# Patient Record
Sex: FEMALE | Race: WHITE | NOT HISPANIC OR LATINO | Employment: PART TIME | URBAN - METROPOLITAN AREA
[De-identification: names, ages, dates, MRNs, and addresses within clinical notes are randomized per-mention and may not be internally consistent; named-entity substitution may affect disease eponyms.]

---
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
## 2019-06-16 ENCOUNTER — APPOINTMENT (OUTPATIENT)
Dept: RADIOLOGY | Facility: MEDICAL CENTER | Age: 19
End: 2019-06-16
Attending: PHYSICIAN ASSISTANT
Payer: COMMERCIAL

## 2019-06-16 ENCOUNTER — OFFICE VISIT (OUTPATIENT)
Dept: URGENT CARE | Facility: MEDICAL CENTER | Age: 19
End: 2019-06-16
Payer: COMMERCIAL

## 2019-06-16 VITALS
OXYGEN SATURATION: 99 % | DIASTOLIC BLOOD PRESSURE: 60 MMHG | HEIGHT: 63 IN | RESPIRATION RATE: 20 BRPM | HEART RATE: 78 BPM | TEMPERATURE: 97.7 F | SYSTOLIC BLOOD PRESSURE: 114 MMHG

## 2019-06-16 DIAGNOSIS — S80.211A ABRASION, KNEE, RIGHT, INITIAL ENCOUNTER: ICD-10-CM

## 2019-06-16 DIAGNOSIS — S90.31XA CONTUSION OF RIGHT FOOT, INITIAL ENCOUNTER: ICD-10-CM

## 2019-06-16 DIAGNOSIS — S99.911A INJURY OF RIGHT ANKLE, INITIAL ENCOUNTER: ICD-10-CM

## 2019-06-16 DIAGNOSIS — S99.911A INJURY OF RIGHT ANKLE, INITIAL ENCOUNTER: Primary | ICD-10-CM

## 2019-06-16 PROCEDURE — G0382 LEV 3 HOSP TYPE B ED VISIT: HCPCS | Performed by: PHYSICIAN ASSISTANT

## 2019-06-16 PROCEDURE — 73610 X-RAY EXAM OF ANKLE: CPT

## 2019-06-16 PROCEDURE — 99283 EMERGENCY DEPT VISIT LOW MDM: CPT | Performed by: PHYSICIAN ASSISTANT

## 2019-06-16 PROCEDURE — 99203 OFFICE O/P NEW LOW 30 MIN: CPT | Performed by: PHYSICIAN ASSISTANT

## 2019-06-16 PROCEDURE — 73630 X-RAY EXAM OF FOOT: CPT

## 2019-06-16 RX ORDER — NAPROXEN 500 MG/1
500 TABLET ORAL 2 TIMES DAILY WITH MEALS
Qty: 30 TABLET | Refills: 0 | Status: SHIPPED | OUTPATIENT
Start: 2019-06-16

## 2020-05-08 ENCOUNTER — HOSPITAL ENCOUNTER (EMERGENCY)
Facility: HOSPITAL | Age: 20
Discharge: HOME | End: 2020-05-08
Attending: EMERGENCY MEDICINE | Admitting: EMERGENCY MEDICINE
Payer: COMMERCIAL

## 2020-05-08 VITALS
WEIGHT: 146 LBS | OXYGEN SATURATION: 99 % | TEMPERATURE: 97.5 F | RESPIRATION RATE: 17 BRPM | HEART RATE: 95 BPM | BODY MASS INDEX: 25.86 KG/M2 | SYSTOLIC BLOOD PRESSURE: 148 MMHG | DIASTOLIC BLOOD PRESSURE: 82 MMHG

## 2020-05-08 DIAGNOSIS — N89.8 VAGINAL DISCHARGE: ICD-10-CM

## 2020-05-08 DIAGNOSIS — Z20.2 POSSIBLE EXPOSURE TO STD: Primary | ICD-10-CM

## 2020-05-08 LAB
BILIRUB UR QL STRIP: NEGATIVE
CLARITY UR: CLEAR
COLOR UR: NORMAL
EXT PREG TEST URINE: NEGATIVE
EXT. CONTROL ED NAV: NORMAL
GLUCOSE UR STRIP-MCNC: NEGATIVE MG/DL
HGB UR QL STRIP.AUTO: NEGATIVE
KETONES UR STRIP-MCNC: NEGATIVE MG/DL
LEUKOCYTE ESTERASE UR QL STRIP: NEGATIVE
NITRITE UR QL STRIP: NEGATIVE
PH UR STRIP.AUTO: 7 [PH]
PROT UR STRIP-MCNC: NEGATIVE MG/DL
SP GR UR STRIP.AUTO: 1.01 (ref 1–1.04)
UROBILINOGEN UA: NEGATIVE MG/DL

## 2020-05-08 PROCEDURE — 99284 EMERGENCY DEPT VISIT MOD MDM: CPT | Performed by: PHYSICIAN ASSISTANT

## 2020-05-08 PROCEDURE — 96372 THER/PROPH/DIAG INJ SC/IM: CPT

## 2020-05-08 PROCEDURE — 99283 EMERGENCY DEPT VISIT LOW MDM: CPT

## 2020-05-08 PROCEDURE — 81003 URINALYSIS AUTO W/O SCOPE: CPT | Performed by: PHYSICIAN ASSISTANT

## 2020-05-08 PROCEDURE — 87186 SC STD MICRODIL/AGAR DIL: CPT | Performed by: PHYSICIAN ASSISTANT

## 2020-05-08 PROCEDURE — 87086 URINE CULTURE/COLONY COUNT: CPT | Performed by: PHYSICIAN ASSISTANT

## 2020-05-08 PROCEDURE — 87077 CULTURE AEROBIC IDENTIFY: CPT | Performed by: PHYSICIAN ASSISTANT

## 2020-05-08 PROCEDURE — 87491 CHLMYD TRACH DNA AMP PROBE: CPT | Performed by: PHYSICIAN ASSISTANT

## 2020-05-08 PROCEDURE — 81025 URINE PREGNANCY TEST: CPT | Performed by: PHYSICIAN ASSISTANT

## 2020-05-08 PROCEDURE — 87591 N.GONORRHOEAE DNA AMP PROB: CPT | Performed by: PHYSICIAN ASSISTANT

## 2020-05-08 RX ORDER — AZITHROMYCIN 250 MG/1
1000 TABLET, FILM COATED ORAL ONCE
Status: COMPLETED | OUTPATIENT
Start: 2020-05-08 | End: 2020-05-08

## 2020-05-08 RX ORDER — METRONIDAZOLE 500 MG/1
500 TABLET ORAL EVERY 12 HOURS SCHEDULED
Qty: 14 TABLET | Refills: 0 | Status: SHIPPED | OUTPATIENT
Start: 2020-05-08 | End: 2020-05-15

## 2020-05-08 RX ADMIN — LIDOCAINE HYDROCHLORIDE 250 MG: 10 INJECTION, SOLUTION EPIDURAL; INFILTRATION; INTRACAUDAL; PERINEURAL at 16:30

## 2020-05-08 RX ADMIN — AZITHROMYCIN 1000 MG: 250 TABLET, FILM COATED ORAL at 16:28

## 2020-05-10 LAB — BACTERIA UR CULT: ABNORMAL

## 2020-05-11 LAB
C TRACH DNA SPEC QL NAA+PROBE: NEGATIVE
N GONORRHOEA DNA SPEC QL NAA+PROBE: NEGATIVE

## 2022-08-08 ENCOUNTER — HOSPITAL ENCOUNTER (EMERGENCY)
Facility: HOSPITAL | Age: 22
Discharge: HOME | End: 2022-08-08
Attending: EMERGENCY MEDICINE
Payer: COMMERCIAL

## 2022-08-08 VITALS
BODY MASS INDEX: 28.7 KG/M2 | SYSTOLIC BLOOD PRESSURE: 134 MMHG | WEIGHT: 162.04 LBS | OXYGEN SATURATION: 98 % | RESPIRATION RATE: 16 BRPM | TEMPERATURE: 98 F | DIASTOLIC BLOOD PRESSURE: 70 MMHG | HEART RATE: 74 BPM

## 2022-08-08 DIAGNOSIS — M54.50 ACUTE BILATERAL LOW BACK PAIN WITHOUT SCIATICA: Primary | ICD-10-CM

## 2022-08-08 DIAGNOSIS — M54.9 BACK PAIN: ICD-10-CM

## 2022-08-08 PROCEDURE — 99283 EMERGENCY DEPT VISIT LOW MDM: CPT

## 2022-08-08 PROCEDURE — 99284 EMERGENCY DEPT VISIT MOD MDM: CPT

## 2022-08-08 RX ORDER — LIDOCAINE 50 MG/G
1 PATCH TOPICAL ONCE
Status: DISCONTINUED | OUTPATIENT
Start: 2022-08-08 | End: 2022-08-08 | Stop reason: HOSPADM

## 2022-08-08 RX ORDER — DIAZEPAM 2 MG/1
2 TABLET ORAL 2 TIMES DAILY
Qty: 14 TABLET | Refills: 0 | Status: SHIPPED | OUTPATIENT
Start: 2022-08-08 | End: 2022-08-08 | Stop reason: CLARIF

## 2022-08-08 RX ORDER — DIAZEPAM 2 MG/1
2 TABLET ORAL 2 TIMES DAILY
Qty: 14 TABLET | Refills: 0 | Status: SHIPPED | OUTPATIENT
Start: 2022-08-08 | End: 2022-08-15

## 2022-08-08 RX ADMIN — LIDOCAINE 1 PATCH: 50 PATCH CUTANEOUS at 09:04

## 2022-08-08 NOTE — ED PROVIDER NOTES
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      24Year old female presents to the ED with chief complaint of lower back pain for the past 5 days  Patient states that she just started a new job at [employer] a few weeks ago and when she was taking out the trash a few days ago she bent down in a weird position and the pain started a day after  Patient has tried taking ibuprofen and tylenol for her pain but it has not helped it to go away  Patient describes pain as a dull pain on bilateral lower back and moves down into her buttocks  Patient denies pain on her spine and states that it is only on the sides and down into her buttocks  Patient denies any fever, saddle paresthesias, numbness or tingling down the legs, gait abnormalities, spine pain, fecal or urinary incontinence, burning on urination, or any other symptoms at this time  Patient denies any medication use other than above mentioned  History provided by:  Patient   used: No    Back Pain  Associated symptoms: no abdominal pain, no chest pain, no dysuria, no fever, no headaches, no numbness, no pelvic pain and no weakness        Prior to Admission Medications   Prescriptions Last Dose Informant Patient Reported? Taking?   naproxen (NAPROSYN) 500 mg tablet   No No   Sig: Take 1 tablet (500 mg total) by mouth 2 (two) times a day with meals      Facility-Administered Medications: None       Past Medical History:   Diagnosis Date    Asthma        Past Surgical History:   Procedure Laterality Date    NO PAST SURGERIES         History reviewed  No pertinent family history  I have reviewed and agree with the history as documented      E-Cigarette/Vaping    E-Cigarette Use Never User      E-Cigarette/Vaping Substances     Social History     Tobacco Use    Smoking status: Never Smoker    Smokeless tobacco: Never Used   Vaping Use    Vaping Use: Never used   Substance Use Topics    Alcohol use: Yes    Drug use: No       Review of Systems   Constitutional: Negative for chills and fever  HENT: Negative for ear pain and sore throat  Eyes: Negative for pain and visual disturbance  Respiratory: Negative for cough and shortness of breath  Cardiovascular: Negative for chest pain and palpitations  Gastrointestinal: Negative for abdominal pain and vomiting  Genitourinary: Negative for difficulty urinating, dysuria, flank pain, frequency, hematuria, menstrual problem, pelvic pain, urgency, vaginal bleeding, vaginal discharge and vaginal pain  Musculoskeletal: Positive for back pain  Negative for arthralgias, gait problem, joint swelling, neck pain and neck stiffness  Skin: Negative for color change and rash  Neurological: Negative for dizziness, seizures, syncope, weakness, numbness and headaches  All other systems reviewed and are negative  Physical Exam  Physical Exam  Vitals and nursing note reviewed  Constitutional:       General: She is not in acute distress  Appearance: She is well-developed  HENT:      Head: Normocephalic and atraumatic  Eyes:      Conjunctiva/sclera: Conjunctivae normal    Cardiovascular:      Rate and Rhythm: Normal rate and regular rhythm  Pulses: Normal pulses  Heart sounds: Normal heart sounds  No murmur heard  Pulmonary:      Effort: Pulmonary effort is normal  No respiratory distress  Breath sounds: Normal breath sounds  Abdominal:      Palpations: Abdomen is soft  Tenderness: There is no abdominal tenderness  Musculoskeletal:         General: Tenderness (Bilateral lower back) present  No deformity or signs of injury  Normal range of motion  Cervical back: Neck supple  Right lower leg: No edema  Left lower leg: No edema  Skin:     General: Skin is warm and dry  Coloration: Skin is not jaundiced or pale  Findings: No erythema or rash  Neurological:      Mental Status: She is alert  Vital Signs  ED Triage Vitals [08/08/22 0828]   Temperature Pulse Respirations Blood Pressure SpO2   98 °F (36 7 °C) 74 16 134/70 98 %      Temp src Heart Rate Source Patient Position - Orthostatic VS BP Location FiO2 (%)   -- Monitor Sitting Right arm --      Pain Score       --           Vitals:    08/08/22 0828   BP: 134/70   Pulse: 74   Patient Position - Orthostatic VS: Sitting         Visual Acuity      ED Medications  Medications - No data to display    Diagnostic Studies  Results Reviewed     None                 No orders to display              Procedures  Procedures         ED Course                                             MDM  Number of Diagnoses or Management Options  Back pain: minor  Diagnosis management comments: Patient presents with signs and symptoms of lower back pain due to musculoskeletal strain  Patient denies any red flag or constitutional symptoms  Treat with topical lidocaine patch applied to the lower back and sent home with Valium BID for 7 days  Amount and/or Complexity of Data Reviewed  Clinical lab tests: reviewed  Tests in the medicine section of CPT®: reviewed    Risk of Complications, Morbidity, and/or Mortality  Presenting problems: minimal  Diagnostic procedures: minimal  Management options: minimal        Disposition  Final diagnoses:   None     ED Disposition     None      Follow-up Information    None         Patient's Medications   Discharge Prescriptions    No medications on file       No discharge procedures on file      PDMP Review     None          ED Provider  Electronically Signed by           Goyo Medrano PA-C  08/08/22 9791

## 2022-08-08 NOTE — DISCHARGE INSTRUCTIONS
Patient informed that her pain was likely due to a musculoskeletal issue with her history of starting a new job and bending over when she was taking out the trash  Patient informed that she will be given a lidocaine patch over the affected areas in the ED and will be prescribed Valium twice a day for 7 days for lower back pain  Patient informed that she may continue to take ibuprofen or tylenol as needed for pain control, but to ensure that she takes ibuprofen or other NSAIDs with food to prevent stomach ulcers or GI upset  Patient informed to not operate motor vehicle or heavy machinery while she is taking Valium as it can make her drowsy  Patient informed to stop taking the Valium and return to the ER if she experiences any of the following symptoms as they may be due to an allergic reaction to the prescription medication: Body swelling, generalized rash, difficulty breathing  Patient also informed to return to the ER if she were to experience any worsening of the pain, loss of bladder or bowel control, or any numbness or tingling in the groin area or down the legs

## 2022-08-08 NOTE — Clinical Note
Joon Pardo was seen and treated in our emergency department on 8/8/2022  No restrictions            Diagnosis:     Triston  may return to school on return date  She may return on this date: 08/09/2022    Triston visited the [place] [address] and [facility] and is cleared back for work tomorrow, 8/9/2022     If you have any questions or concerns, please don't hesitate to call        Jossy Elena PA-C    ______________________________           _______________          _______________  Hospital Representative                              Date                                Time

## 2024-04-28 ENCOUNTER — HOSPITAL ENCOUNTER (EMERGENCY)
Facility: HOSPITAL | Age: 24
Discharge: HOME | End: 2024-04-28
Attending: EMERGENCY MEDICINE

## 2024-04-28 ENCOUNTER — APPOINTMENT (EMERGENCY)
Dept: ULTRASOUND IMAGING | Facility: HOSPITAL | Age: 24
End: 2024-04-28

## 2024-04-28 VITALS
TEMPERATURE: 98.1 F | OXYGEN SATURATION: 97 % | SYSTOLIC BLOOD PRESSURE: 137 MMHG | HEART RATE: 63 BPM | BODY MASS INDEX: 30.27 KG/M2 | DIASTOLIC BLOOD PRESSURE: 72 MMHG | WEIGHT: 170.86 LBS | RESPIRATION RATE: 18 BRPM

## 2024-04-28 DIAGNOSIS — Z34.90 PREGNANCY: ICD-10-CM

## 2024-04-28 DIAGNOSIS — M54.50 LOW BACK PAIN: Primary | ICD-10-CM

## 2024-04-28 LAB
ABO GROUP BLD: NORMAL
ANION GAP SERPL CALCULATED.3IONS-SCNC: 6 MMOL/L (ref 4–13)
B-HCG SERPL-ACNC: 438 MIU/ML (ref 0–5)
BACTERIA UR QL AUTO: ABNORMAL /HPF
BASOPHILS # BLD AUTO: 0.02 THOUSANDS/ÂΜL (ref 0–0.1)
BASOPHILS NFR BLD AUTO: 0 % (ref 0–1)
BILIRUB UR QL STRIP: ABNORMAL
BUN SERPL-MCNC: 8 MG/DL (ref 5–25)
CALCIUM SERPL-MCNC: 9.5 MG/DL (ref 8.4–10.2)
CHLORIDE SERPL-SCNC: 104 MMOL/L (ref 96–108)
CLARITY UR: ABNORMAL
CO2 SERPL-SCNC: 26 MMOL/L (ref 21–32)
COLOR UR: ABNORMAL
CREAT SERPL-MCNC: 0.85 MG/DL (ref 0.6–1.3)
EOSINOPHIL # BLD AUTO: 0.04 THOUSAND/ÂΜL (ref 0–0.61)
EOSINOPHIL NFR BLD AUTO: 1 % (ref 0–6)
ERYTHROCYTE [DISTWIDTH] IN BLOOD BY AUTOMATED COUNT: 12.2 % (ref 11.6–15.1)
EXT PREGNANCY TEST URINE: POSITIVE
EXT. CONTROL: ABNORMAL
GFR SERPL CREATININE-BSD FRML MDRD: 96 ML/MIN/1.73SQ M
GLUCOSE SERPL-MCNC: 89 MG/DL (ref 65–140)
GLUCOSE UR STRIP-MCNC: NEGATIVE MG/DL
HCT VFR BLD AUTO: 38.4 % (ref 34.8–46.1)
HGB BLD-MCNC: 12.9 G/DL (ref 11.5–15.4)
HGB UR QL STRIP.AUTO: ABNORMAL
IMM GRANULOCYTES # BLD AUTO: 0.02 THOUSAND/UL (ref 0–0.2)
IMM GRANULOCYTES NFR BLD AUTO: 0 % (ref 0–2)
KETONES UR STRIP-MCNC: ABNORMAL MG/DL
LEUKOCYTE ESTERASE UR QL STRIP: NEGATIVE
LYMPHOCYTES # BLD AUTO: 1.53 THOUSANDS/ÂΜL (ref 0.6–4.47)
LYMPHOCYTES NFR BLD AUTO: 20 % (ref 14–44)
MCH RBC QN AUTO: 30.4 PG (ref 26.8–34.3)
MCHC RBC AUTO-ENTMCNC: 33.6 G/DL (ref 31.4–37.4)
MCV RBC AUTO: 91 FL (ref 82–98)
MONOCYTES # BLD AUTO: 0.6 THOUSAND/ÂΜL (ref 0.17–1.22)
MONOCYTES NFR BLD AUTO: 8 % (ref 4–12)
MUCOUS THREADS UR QL AUTO: ABNORMAL
NEUTROPHILS # BLD AUTO: 5.59 THOUSANDS/ÂΜL (ref 1.85–7.62)
NEUTS SEG NFR BLD AUTO: 71 % (ref 43–75)
NITRITE UR QL STRIP: NEGATIVE
NON-SQ EPI CELLS URNS QL MICRO: ABNORMAL /HPF
NRBC BLD AUTO-RTO: 0 /100 WBCS
PH UR STRIP.AUTO: 6 [PH] (ref 4.5–8)
PLATELET # BLD AUTO: 247 THOUSANDS/UL (ref 149–390)
PMV BLD AUTO: 10.1 FL (ref 8.9–12.7)
POTASSIUM SERPL-SCNC: 4 MMOL/L (ref 3.5–5.3)
PROT UR STRIP-MCNC: ABNORMAL MG/DL
RBC # BLD AUTO: 4.24 MILLION/UL (ref 3.81–5.12)
RBC #/AREA URNS AUTO: ABNORMAL /HPF
RH BLD: POSITIVE
SODIUM SERPL-SCNC: 136 MMOL/L (ref 135–147)
SP GR UR STRIP.AUTO: >=1.03 (ref 1–1.03)
UROBILINOGEN UR QL STRIP.AUTO: 1 E.U./DL
WBC # BLD AUTO: 7.8 THOUSAND/UL (ref 4.31–10.16)
WBC #/AREA URNS AUTO: ABNORMAL /HPF

## 2024-04-28 PROCEDURE — 86901 BLOOD TYPING SEROLOGIC RH(D): CPT | Performed by: EMERGENCY MEDICINE

## 2024-04-28 PROCEDURE — 99284 EMERGENCY DEPT VISIT MOD MDM: CPT | Performed by: EMERGENCY MEDICINE

## 2024-04-28 PROCEDURE — 85025 COMPLETE CBC W/AUTO DIFF WBC: CPT | Performed by: EMERGENCY MEDICINE

## 2024-04-28 PROCEDURE — 84702 CHORIONIC GONADOTROPIN TEST: CPT | Performed by: EMERGENCY MEDICINE

## 2024-04-28 PROCEDURE — 76815 OB US LIMITED FETUS(S): CPT

## 2024-04-28 PROCEDURE — 99284 EMERGENCY DEPT VISIT MOD MDM: CPT

## 2024-04-28 PROCEDURE — 86900 BLOOD TYPING SEROLOGIC ABO: CPT | Performed by: EMERGENCY MEDICINE

## 2024-04-28 PROCEDURE — 36415 COLL VENOUS BLD VENIPUNCTURE: CPT | Performed by: EMERGENCY MEDICINE

## 2024-04-28 PROCEDURE — 80048 BASIC METABOLIC PNL TOTAL CA: CPT | Performed by: EMERGENCY MEDICINE

## 2024-04-28 PROCEDURE — 81001 URINALYSIS AUTO W/SCOPE: CPT

## 2024-04-28 PROCEDURE — 81025 URINE PREGNANCY TEST: CPT | Performed by: EMERGENCY MEDICINE

## 2024-04-28 RX ORDER — BUPROPION HYDROCHLORIDE 150 MG/1
300 TABLET ORAL DAILY
COMMUNITY

## 2024-04-28 RX ORDER — FAMOTIDINE 20 MG
1 TABLET ORAL DAILY
Qty: 30 TABLET | Refills: 1 | Status: SHIPPED | OUTPATIENT
Start: 2024-04-28

## 2024-04-28 RX ORDER — ESCITALOPRAM OXALATE 10 MG/1
10 TABLET ORAL DAILY
COMMUNITY

## 2024-04-28 RX ORDER — ACETAMINOPHEN 325 MG/1
975 TABLET ORAL ONCE
Status: COMPLETED | OUTPATIENT
Start: 2024-04-28 | End: 2024-04-28

## 2024-04-28 RX ADMIN — ACETAMINOPHEN 975 MG: 325 TABLET, FILM COATED ORAL at 18:03

## 2024-04-28 NOTE — ED PROVIDER NOTES
History  Chief Complaint   Patient presents with    Back Pain     Pt c/o back pain for the last week that she states is getting worse. Also states that she had a positive home pregnancy test along with nausea and she would like to confirm her pregnancy. LMP was 2/27     Bijal is a very pleasant 24 yo F here for evaluation of       Back Pain      Prior to Admission Medications   Prescriptions Last Dose Informant Patient Reported? Taking?   buPROPion (WELLBUTRIN XL) 150 mg 24 hr tablet   Yes Yes   Sig: Take 300 mg by mouth daily   diazepam (VALIUM) 2 mg tablet   No No   Sig: Take 1 tablet (2 mg total) by mouth 2 (two) times a day for 7 days   escitalopram (LEXAPRO) 10 mg tablet   Yes Yes   Sig: Take 10 mg by mouth daily   naproxen (NAPROSYN) 500 mg tablet   No No   Sig: Take 1 tablet (500 mg total) by mouth 2 (two) times a day with meals      Facility-Administered Medications: None       Past Medical History:   Diagnosis Date    Asthma        Past Surgical History:   Procedure Laterality Date    NO PAST SURGERIES         History reviewed. No pertinent family history.  I have reviewed and agree with the history as documented.    E-Cigarette/Vaping    E-Cigarette Use Never User      E-Cigarette/Vaping Substances     Social History     Tobacco Use    Smoking status: Never    Smokeless tobacco: Never   Vaping Use    Vaping status: Never Used   Substance Use Topics    Alcohol use: Yes    Drug use: No       Review of Systems   Musculoskeletal:  Positive for back pain.       Physical Exam  Physical Exam    Vital Signs  ED Triage Vitals   Temperature Pulse Respirations Blood Pressure SpO2   04/28/24 1604 04/28/24 1604 04/28/24 1604 04/28/24 1604 04/28/24 1604   98.1 °F (36.7 °C) (!) 108 18 141/78 99 %      Temp Source Heart Rate Source Patient Position - Orthostatic VS BP Location FiO2 (%)   04/28/24 1604 04/28/24 1604 04/28/24 1804 04/28/24 1804 --   Oral Monitor Sitting Right arm       Pain Score       04/28/24 1604        4           Vitals:    04/28/24 1604 04/28/24 1804   BP: 141/78 137/72   Pulse: (!) 108 63   Patient Position - Orthostatic VS:  Sitting         Visual Acuity      ED Medications  Medications   acetaminophen (TYLENOL) tablet 975 mg (975 mg Oral Given 4/28/24 1803)       Diagnostic Studies  Results Reviewed       Procedure Component Value Units Date/Time    hCG, quantitative [[id number]]  (Abnormal) Collected: 04/28/24 1631    Lab Status: Final result Specimen: Blood from Arm, Right Updated: 04/28/24 1710     HCG, Quant 438.0 mIU/mL     Narrative:       Expected Ranges:    HCG results between 5.0 and 25.0 mIU/mL may be indicative of early pregnancy but should be interpreted in light of the total clinical presentation.    HCG can rise to detectable levels in carmella and post menopausal women (0-11.6 mIU/mL).     Approximate               Approximate HCG  Gestation age          Concentration ( mIU/mL)  _____________          ______________________   Weeks                      HCG values  0.2-1                       5-50  1-2                           2-3                         100-5000  3-4                         500-23155  4-5                         1000-26718  5-6                         [id number]  6-8                         87232-355599  8-12                        42693-630514      Basic metabolic panel [[id number]] Collected: 04/28/24 1631    Lab Status: Final result Specimen: Blood from Arm, Right Updated: 04/28/24 1702     Sodium 136 mmol/L      Potassium 4.0 mmol/L      Chloride 104 mmol/L      CO2 26 mmol/L      ANION GAP 6 mmol/L      BUN 8 mg/dL      Creatinine 0.85 mg/dL      Glucose 89 mg/dL      Calcium 9.5 mg/dL      eGFR 96 ml/min/1.73sq m     Narrative:      National Kid[employer] guidelines for Chronic Kidney Disease (CKD):     Stage 1 with normal or high GFR (GFR > 90 mL/min/1.73 square meters)    Stage 2 Mild CKD (GFR = 60-89 mL/min/1.73 square meters)    Stage 3A Moderate CKD  (GFR = 45-59 mL/min/1.73 square meters)    Stage 3B Moderate CKD (GFR = 30-44 mL/min/1.73 square meters)    Stage 4 Severe CKD (GFR = 15-29 mL/min/1.73 square meters)    Stage 5 End Stage CKD (GFR <15 mL/min/1.73 square meters)  Note: GFR calculation is accurate only with a steady state creatinine    CBC and differential [[id number]] Collected: 04/28/24 1631    Lab Status: Final result Specimen: Blood from Arm, Right Updated: 04/28/24 1645     WBC 7.80 Thousand/uL      RBC 4.24 Million/uL      Hemoglobin 12.9 g/dL      Hematocrit 38.4 %      MCV 91 fL      MCH 30.4 pg      MCHC 33.6 g/dL      RDW 12.2 %      MPV 10.1 fL      Platelets 247 Thousands/uL      nRBC 0 /100 WBCs      Segmented % 71 %      Immature Grans % 0 %      Lymphocytes % 20 %      Monocytes % 8 %      Eosinophils Relative 1 %      Basophils Relative 0 %      Absolute Neutrophils 5.59 Thousands/µL      Absolute Immature Grans 0.02 Thousand/uL      Absolute Lymphocytes 1.53 Thousands/µL      Absolute Monocytes 0.60 Thousand/µL      Eosinophils Absolute 0.04 Thousand/µL      Basophils Absolute 0.02 Thousands/µL     Urine Microscopic [[id number]]  (Abnormal) Collected: 04/28/24 1625    Lab Status: Final result Specimen: Urine, Clean Catch Updated: 04/28/24 1642     RBC, UA 10-20 /hpf      WBC, UA 2-4 /hpf      Epithelial Cells Moderate /hpf      Bacteria, UA Occasional /hpf      MUCUS THREADS Innumerable    POCT pregnancy, urine [[id number]]  (Abnormal) Resulted: 04/28/24 1627    Lab Status: Final result Updated: 04/28/24 1627     EXT Preg Test, Ur Positive     Control Valid    Urine Macroscopic, POC [[id number]]  (Abnormal) Collected: 04/28/24 1625    Lab Status: Final result Specimen: Urine Updated: 04/28/24 1626     Color, UA Kasey     Clarity, UA Turbid     pH, UA 6.0     Leukocytes, UA Negative     Nitrite, UA Negative     Protein, UA Trace mg/dl      Glucose, UA Negative mg/dl      Ketones, UA Trace mg/dl      Urobilinogen, UA 1.0 E.U./dl       Bilirubin, UA Small     Occult Blood, UA Trace     Specific Gravity, UA >=1.030    Narrative:      CLINITEK RESULT                   US OB pregnancy limited with transvaginal    (Results Pending)              Procedures  Procedures         ED Course                               SBIRT 20yo+      Flowsheet Row Most Recent Value   Initial Alcohol Screen: US AUDIT-C     1. How often do you have a drink containing alcohol? 0 Filed at: 04/28/2024 1630   2. How many drinks containing alcohol do you have on a typical day you are drinking?  0 Filed at: 04/28/2024 1630   3a. Male UNDER 65: How often do you have five or more drinks on one occasion? 0 Filed at: 04/28/2024 1630   3b. FEMALE Any Age, or MALE 65+: How often do you have 4 or more drinks on one occassion? 0 Filed at: 04/28/2024 1630   Audit-C Score 0 Filed at: 04/28/2024 1630   SARA: How many times in the past year have you...    Used an illegal drug or used a prescription medication for non-medical reasons? Never Filed at: 04/28/2024 1630                      Medical Decision Making  Amount and/or Complexity of Data Reviewed  Labs: ordered.  Radiology: ordered.    Risk  OTC drugs.             Disposition  Final diagnoses:   None     ED Disposition       None          Follow-up Information    None         Patient's Medications   Discharge Prescriptions    No medications on file       No discharge procedures on file.    PDMP Review       None            ED Provider  Electronically Signed by           [[id number]]  (Abnormal) Resulted: 24 162    Lab Status: Final result Updated: 24     EXT Preg Test, Ur Positive     Control Valid    Urine Macroscopic, POC [[id number]]  (Abnormal) Collected: 24 1625    Lab Status: Final result Specimen: Urine Updated: 24     Color, UA Kasey     Clarity, UA Turbid     pH, UA 6.0     Leukocytes, UA Negative     Nitrite, UA Negative     Protein, UA Trace mg/dl      Glucose, UA Negative mg/dl      Ketones, UA Trace mg/dl      Urobilinogen, UA 1.0 E.U./dl      Bilirubin, UA Small     Occult Blood, UA Trace     Specific Gravity, UA >=1.030    Narrative:      CLINITEK RESULT                   US OB pregnancy limited with transvaginal   Final Result by Cally Aguilar MD ([id number])   No intrauterine gestation or adnexal mass identified. Mild simple free fluid present within the cul-de-sac.   Differential remains early IUP, spontaneous  and ectopic pregnancy.  Correlate with serial quantitative BHCG.            Workstation performed: [id number]                    Procedures  Procedures         ED Course                               SBIRT 22yo+      Flowsheet Row Most Recent Value   Initial Alcohol Screen: US AUDIT-C     1. How often do you have a drink containing alcohol? 0 Filed at: 2024 1630   2. How many drinks containing alcohol do you have on a typical day you are drinking?  0 Filed at: 2024 1630   3a. Male UNDER 65: How often do you have five or more drinks on one occasion? 0 Filed at: 2024 1630   3b. FEMALE Any Age, or MALE 65+: How often do you have 4 or more drinks on one occassion? 0 Filed at: 2024 1630   Audit-C Score 0 Filed at: 2024 1630   SARA: How many times in the past year have you...    Used an illegal drug or used a prescription medication for non-medical reasons? Never Filed at: 2024 1630                      Medical Decision Making  23-year-old female here for evaluation of  mild low back pain and positive home pregnancy test.  Pregnancy confirmed today but hCG rather low at less than 500.  Ultrasound demonstrated no IUP, nor any evidence of ectopic.  Had long detailed discussion with the patient that differential remains early pregnancy versus threatened miscarriage versus ectopic.  Instructed to repeat serum hCG in about 48 hours and follow-up closely with OB.  Discussed reasons to return to the emergency department.    Amount and/or Complexity of Data Reviewed  Labs: ordered. Decision-making details documented in ED Course.  Radiology: ordered. Decision-making details documented in ED Course.    Risk  OTC drugs.             Disposition  Final diagnoses:   Low back pain   Pregnancy     Time reflects when diagnosis was documented in both MDM as applicable and the Disposition within this note       Time User Action Codes Description Comment    4/28/2024  7:06 PM Bryce Beck [M54.50] Low back pain     4/28/2024  7:06 PM Bryce Beck [Z34.90] Pregnancy           ED Disposition       ED Disposition   Discharge    Condition   Stable    Date/Time   Sun Apr 28, 2024 1906    Comment   Bijal Davis discharge to home/self care.                   Follow-up Information       Follow up With Specialties Details Why Contact Info Additional Information    [facility]   [address] [place] [id number] [phone] [facility], [address] [place] [address], [place] [address], [place], [place], [address] [phone]            Discharge Medication List as of 4/28/2024  7:12 PM        START taking these medications    Details   Prenatal Vit-Fe Fumarate-FA (Prenatal Complete) 14-0.4 MG TABS Take 1 tablet by mouth daily, Starting Sun 4/28/2024, Normal           CONTINUE these medications which have NOT CHANGED    Details   buPROPion (WELLBUTRIN XL) 150 mg 24 hr tablet Take 300 mg by  mouth daily, Historical Med      escitalopram (LEXAPRO) 10 mg tablet Take 10 mg by mouth daily, Historical Med           STOP taking these medications       diazepam (VALIUM) 2 mg tablet Comments:   Reason for Stopping:         naproxen (NAPROSYN) 500 mg tablet Comments:   Reason for Stopping:               Outpatient Discharge Orders   hCG, quantitative   Standing Status: Future Number of Occurrences: 1 Standing Exp. Date: 04/28/25       PDMP Review       None            ED Provider  Electronically Signed by             Bryce Beck MD  05/22/24 6906

## 2024-04-30 ENCOUNTER — LAB (OUTPATIENT)
Dept: LAB | Facility: HOSPITAL | Age: 24
End: 2024-04-30

## 2024-04-30 DIAGNOSIS — Z34.90 PREGNANCY: ICD-10-CM

## 2024-04-30 LAB — B-HCG SERPL-ACNC: 937.9 MIU/ML (ref 0–5)

## 2024-04-30 PROCEDURE — 36415 COLL VENOUS BLD VENIPUNCTURE: CPT

## 2024-04-30 PROCEDURE — 84702 CHORIONIC GONADOTROPIN TEST: CPT

## 2024-05-03 ENCOUNTER — ULTRASOUND (OUTPATIENT)
Dept: OBGYN CLINIC | Facility: CLINIC | Age: 24
End: 2024-05-03

## 2024-05-03 ENCOUNTER — APPOINTMENT (OUTPATIENT)
Dept: LAB | Age: 24
End: 2024-05-03

## 2024-05-03 ENCOUNTER — TELEPHONE (OUTPATIENT)
Dept: OBGYN CLINIC | Facility: CLINIC | Age: 24
End: 2024-05-03

## 2024-05-03 VITALS
HEIGHT: 63 IN | SYSTOLIC BLOOD PRESSURE: 130 MMHG | WEIGHT: 171.8 LBS | DIASTOLIC BLOOD PRESSURE: 86 MMHG | BODY MASS INDEX: 30.44 KG/M2 | OXYGEN SATURATION: 100 % | HEART RATE: 96 BPM

## 2024-05-03 DIAGNOSIS — O36.80X1 ENCOUNTER TO DETERMINE FETAL VIABILITY OF PREGNANCY, FETUS 1: ICD-10-CM

## 2024-05-03 DIAGNOSIS — O36.80X1 ENCOUNTER TO DETERMINE FETAL VIABILITY OF PREGNANCY, FETUS 1: Primary | ICD-10-CM

## 2024-05-03 PROBLEM — F33.2 SEVERE EPISODE OF RECURRENT MAJOR DEPRESSIVE DISORDER, WITHOUT PSYCHOTIC FEATURES (HCC): Status: ACTIVE | Noted: 2023-02-03

## 2024-05-03 LAB — B-HCG SERPL-ACNC: 4287 MIU/ML (ref 0–5)

## 2024-05-03 PROCEDURE — 84702 CHORIONIC GONADOTROPIN TEST: CPT

## 2024-05-03 PROCEDURE — 36415 COLL VENOUS BLD VENIPUNCTURE: CPT

## 2024-05-03 NOTE — TELEPHONE ENCOUNTER
Called patient to confirm if she was self pay. Pt confirmed and said she would like to try to apply for insurance but still wants to be seen today for her visit on 5/3/24. I advised patient of the potential costs, pt understands. I advised pt that she cannot obtain insurance in the next few weeks, she must be seen at [facility]. Pt confirms understanding.

## 2024-05-03 NOTE — TELEPHONE ENCOUNTER
Called pt and lvm to call back and ask for Martina. Called pt to get more information on her insurance status, and to make aware of self pay policy.

## 2024-05-03 NOTE — PROGRESS NOTES
[facility] OBST[facility]TRASOUND  5/3/2024   Vladislav Ellis MD     INDICATION: Amenorrhea, viability  .  COMPARISON: US 4/28/24: No IUP, mild simple free fluid in cul-de-sac, no adnexal mass identified     TECHNIQUE:   Transvaginal imaging was performed to assess the gestation, myometrial/endometrial architecture and ovarian parenchymal detail.    The study includes volumetric sweeps and traditional still imaging technique.      FINDINGS:     No intrauterine pregnancy or adnexal mass is identified. No free fluid is present. Endometrial lining 11.2 mm.     UTERUS/ADNEXA:   No adnexal mass or pathologic cyst.  No free fluid identified.     IMPRESSION:     No intrauterine pregnancy or adnexal mass is identified. No free fluid is present. Endometrial lining 11.2 mm.     Patient reports LMP as 2/27/24; her periods are usually regular and she is sure of the date. She had intercourse at the beginning and end of March with two different partners. HCG on 4/30 was 937, rising appropriately from 438 two days prior. Discussed with patient that she is likely early in pregnancy, but will continue to monitor for signs of ectopic pregnancy. Will obtain another HCG and repeat US in 2 weeks.

## 2024-05-14 ENCOUNTER — TELEPHONE (OUTPATIENT)
Age: 24
End: 2024-05-14

## 2024-05-14 NOTE — TELEPHONE ENCOUNTER
----- Message from Rhonda Lomois MD sent at 5/14/2024  2:18 AM EDT -----  Please inform patient that her HCG is rising appropriately, she needs another dating ultrasound scheduled.

## 2024-05-14 NOTE — TELEPHONE ENCOUNTER
Patient called. Reviewed note from provider. Scheduled D/V 5/20. Reviewed pregnancy safe meds for nausea/vomiting per patient request. Pregnancy essentials guide sent. Patient verbalized understanding. No further questions at this time.

## 2024-05-15 ENCOUNTER — APPOINTMENT (EMERGENCY)
Dept: ULTRASOUND IMAGING | Facility: HOSPITAL | Age: 24
End: 2024-05-15

## 2024-05-15 ENCOUNTER — HOSPITAL ENCOUNTER (EMERGENCY)
Facility: HOSPITAL | Age: 24
Discharge: HOME | End: 2024-05-15
Attending: EMERGENCY MEDICINE

## 2024-05-15 VITALS
OXYGEN SATURATION: 98 % | TEMPERATURE: 98.5 F | DIASTOLIC BLOOD PRESSURE: 67 MMHG | RESPIRATION RATE: 18 BRPM | SYSTOLIC BLOOD PRESSURE: 119 MMHG | BODY MASS INDEX: 29.45 KG/M2 | HEART RATE: 82 BPM | WEIGHT: 166.23 LBS

## 2024-05-15 DIAGNOSIS — O21.9 NAUSEA AND VOMITING IN PREGNANCY: Primary | ICD-10-CM

## 2024-05-15 DIAGNOSIS — E87.6 HYPOKALEMIA: ICD-10-CM

## 2024-05-15 DIAGNOSIS — R82.90 ABNORMAL URINE: ICD-10-CM

## 2024-05-15 LAB
ALBUMIN SERPL BCP-MCNC: 4.4 G/DL (ref 3.5–5)
ALP SERPL-CCNC: 46 U/L (ref 34–104)
ALT SERPL W P-5'-P-CCNC: 13 U/L (ref 7–52)
ANION GAP SERPL CALCULATED.3IONS-SCNC: 12 MMOL/L (ref 4–13)
AST SERPL W P-5'-P-CCNC: 12 U/L (ref 13–39)
BACTERIA UR QL AUTO: ABNORMAL /HPF
BASOPHILS # BLD AUTO: 0.03 THOUSANDS/ÂΜL (ref 0–0.1)
BASOPHILS NFR BLD AUTO: 0 % (ref 0–1)
BILIRUB DIRECT SERPL-MCNC: 0.15 MG/DL (ref 0–0.2)
BILIRUB SERPL-MCNC: 0.64 MG/DL (ref 0.2–1)
BILIRUB UR QL STRIP: NEGATIVE
BUN SERPL-MCNC: 7 MG/DL (ref 5–25)
CALCIUM SERPL-MCNC: 9.6 MG/DL (ref 8.4–10.2)
CHLORIDE SERPL-SCNC: 101 MMOL/L (ref 96–108)
CLARITY UR: CLEAR
CO2 SERPL-SCNC: 25 MMOL/L (ref 21–32)
COLOR UR: YELLOW
CREAT SERPL-MCNC: 0.71 MG/DL (ref 0.6–1.3)
EOSINOPHIL # BLD AUTO: 0.01 THOUSAND/ÂΜL (ref 0–0.61)
EOSINOPHIL NFR BLD AUTO: 0 % (ref 0–6)
ERYTHROCYTE [DISTWIDTH] IN BLOOD BY AUTOMATED COUNT: 12.2 % (ref 11.6–15.1)
EXT PREGNANCY TEST URINE: POSITIVE
EXT. CONTROL: ABNORMAL
GFR SERPL CREATININE-BSD FRML MDRD: 119 ML/MIN/1.73SQ M
GLUCOSE SERPL-MCNC: 93 MG/DL (ref 65–140)
GLUCOSE UR STRIP-MCNC: NEGATIVE MG/DL
HCT VFR BLD AUTO: 39.5 % (ref 34.8–46.1)
HGB BLD-MCNC: 13.8 G/DL (ref 11.5–15.4)
HGB UR QL STRIP.AUTO: ABNORMAL
IMM GRANULOCYTES # BLD AUTO: 0.09 THOUSAND/UL (ref 0–0.2)
IMM GRANULOCYTES NFR BLD AUTO: 1 % (ref 0–2)
KETONES UR STRIP-MCNC: ABNORMAL MG/DL
LEUKOCYTE ESTERASE UR QL STRIP: NEGATIVE
LIPASE SERPL-CCNC: 9 U/L (ref 11–82)
LYMPHOCYTES # BLD AUTO: 1.38 THOUSANDS/ÂΜL (ref 0.6–4.47)
LYMPHOCYTES NFR BLD AUTO: 11 % (ref 14–44)
MCH RBC QN AUTO: 31.4 PG (ref 26.8–34.3)
MCHC RBC AUTO-ENTMCNC: 34.9 G/DL (ref 31.4–37.4)
MCV RBC AUTO: 90 FL (ref 82–98)
MONOCYTES # BLD AUTO: 0.66 THOUSAND/ÂΜL (ref 0.17–1.22)
MONOCYTES NFR BLD AUTO: 5 % (ref 4–12)
MUCOUS THREADS UR QL AUTO: ABNORMAL
NEUTROPHILS # BLD AUTO: 10.91 THOUSANDS/ÂΜL (ref 1.85–7.62)
NEUTS SEG NFR BLD AUTO: 83 % (ref 43–75)
NITRITE UR QL STRIP: NEGATIVE
NON-SQ EPI CELLS URNS QL MICRO: ABNORMAL /HPF
NRBC BLD AUTO-RTO: 0 /100 WBCS
PH UR STRIP.AUTO: 7 [PH] (ref 4.5–8)
PLATELET # BLD AUTO: 236 THOUSANDS/UL (ref 149–390)
PMV BLD AUTO: 9.6 FL (ref 8.9–12.7)
POTASSIUM SERPL-SCNC: 3.3 MMOL/L (ref 3.5–5.3)
PROT SERPL-MCNC: 7.1 G/DL (ref 6.4–8.4)
PROT UR STRIP-MCNC: NEGATIVE MG/DL
RBC # BLD AUTO: 4.4 MILLION/UL (ref 3.81–5.12)
RBC #/AREA URNS AUTO: ABNORMAL /HPF
SODIUM SERPL-SCNC: 138 MMOL/L (ref 135–147)
SP GR UR STRIP.AUTO: 1.01 (ref 1–1.03)
TRANS CELLS #/AREA URNS HPF: PRESENT /[HPF]
UROBILINOGEN UR QL STRIP.AUTO: 1 E.U./DL
WBC # BLD AUTO: 13.08 THOUSAND/UL (ref 4.31–10.16)
WBC #/AREA URNS AUTO: ABNORMAL /HPF

## 2024-05-15 PROCEDURE — 81025 URINE PREGNANCY TEST: CPT

## 2024-05-15 PROCEDURE — 96375 TX/PRO/DX INJ NEW DRUG ADDON: CPT

## 2024-05-15 PROCEDURE — 80076 HEPATIC FUNCTION PANEL: CPT

## 2024-05-15 PROCEDURE — 87086 URINE CULTURE/COLONY COUNT: CPT

## 2024-05-15 PROCEDURE — 99284 EMERGENCY DEPT VISIT MOD MDM: CPT

## 2024-05-15 PROCEDURE — 76801 OB US < 14 WKS SINGLE FETUS: CPT

## 2024-05-15 PROCEDURE — 83690 ASSAY OF LIPASE: CPT

## 2024-05-15 PROCEDURE — 81001 URINALYSIS AUTO W/SCOPE: CPT

## 2024-05-15 PROCEDURE — 36415 COLL VENOUS BLD VENIPUNCTURE: CPT

## 2024-05-15 PROCEDURE — 80048 BASIC METABOLIC PNL TOTAL CA: CPT

## 2024-05-15 PROCEDURE — 96365 THER/PROPH/DIAG IV INF INIT: CPT

## 2024-05-15 PROCEDURE — 81514 NFCT DS BV&VAGINITIS DNA ALG: CPT

## 2024-05-15 PROCEDURE — 85025 COMPLETE CBC W/AUTO DIFF WBC: CPT

## 2024-05-15 RX ORDER — METOCLOPRAMIDE 10 MG/1
10 TABLET ORAL EVERY 6 HOURS
Qty: 30 TABLET | Refills: 0 | Status: SHIPPED | OUTPATIENT
Start: 2024-05-15

## 2024-05-15 RX ORDER — METOCLOPRAMIDE HYDROCHLORIDE 5 MG/ML
10 INJECTION INTRAMUSCULAR; INTRAVENOUS ONCE
Status: COMPLETED | OUTPATIENT
Start: 2024-05-15 | End: 2024-05-15

## 2024-05-15 RX ADMIN — SODIUM CHLORIDE, SODIUM LACTATE, POTASSIUM CHLORIDE, AND CALCIUM CHLORIDE 1000 ML: .6; .31; .03; .02 INJECTION, SOLUTION INTRAVENOUS at 14:43

## 2024-05-15 RX ADMIN — METOCLOPRAMIDE 10 MG: 5 INJECTION, SOLUTION INTRAMUSCULAR; INTRAVENOUS at 14:45

## 2024-05-15 NOTE — ED PROVIDER NOTES
"History  Chief Complaint   Patient presents with    Vomiting During Pregnancy     Pt reports she is approx 11 weeks pregnant and has been vomiting x2 days. Pt reports unable to keep anything down     Patient is a 23-year-old female with history of asthma currently in her first trimester pregnancy presenting for evaluation of vomiting for 2 days.  Reports frequent episodes of nonbloody, nonbilious vomiting that worsened today.  She has been unable to tolerate any p.o. intake.  She has lower abdominal \"soreness\" which she attributes to the vomiting.  No vaginal bleeding. No urinary symptoms. She does report a few days of creamy white vaginal discharge. She is unsure exactly how far along she is as her previous 2 ultrasounds have had difficulty with dating but she has had increasing beta HCG levels.          Prior to Admission Medications   Prescriptions Last Dose Informant Patient Reported? Taking?   Prenatal Vit-Fe Fumarate-FA (Prenatal Complete) 14-0.4 MG TABS   No Yes   Sig: Take 1 tablet by mouth daily   buPROPion (WELLBUTRIN XL) 150 mg 24 hr tablet   Yes Yes   Sig: Take 300 mg by mouth daily   escitalopram (LEXAPRO) 10 mg tablet   Yes Yes   Sig: Take 10 mg by mouth daily      Facility-Administered Medications: None       Past Medical History:   Diagnosis Date    Asthma        Past Surgical History:   Procedure Laterality Date    NO PAST SURGERIES         Family History   Problem Relation Age of Onset    Asthma Brother     No Known Problems Daughter     Diabetes Maternal Grandmother     Heart disease Maternal Grandmother     Cancer Maternal Grandfather      I have reviewed and agree with the history as documented.    E-Cigarette/Vaping    E-Cigarette Use Former User      E-Cigarette/Vaping Substances    Nicotine Yes     THC No     CBD No     Flavoring Yes     Other No     Unknown No      Social History     Tobacco Use    Smoking status: Never    Smokeless tobacco: Never   Vaping Use    Vaping status: Former    " Substances: Nicotine, Flavoring   Substance Use Topics    Alcohol use: Not Currently    Drug use: No       Review of Systems   Constitutional:  Negative for chills and fever.   HENT:  Negative for ear pain and sore throat.    Eyes:  Negative for pain and visual disturbance.   Respiratory:  Negative for cough and shortness of breath.    Cardiovascular:  Negative for chest pain and palpitations.   Gastrointestinal:  Positive for abdominal pain, nausea and vomiting. Negative for constipation and diarrhea.   Genitourinary:  Negative for dysuria, frequency and hematuria.   Musculoskeletal:  Negative for arthralgias and back pain.   Skin:  Negative for color change and rash.   Neurological:  Negative for seizures and syncope.   All other systems reviewed and are negative.      Physical Exam  Physical Exam  Vitals and nursing note reviewed.   Constitutional:       General: She is not in acute distress.     Appearance: Normal appearance. She is not toxic-appearing.   HENT:      Head: Normocephalic and atraumatic.      Right Ear: External ear normal.      Left Ear: External ear normal.      Nose: Nose normal.      Mouth/Throat:      Mouth: Mucous membranes are moist.      Pharynx: No posterior oropharyngeal erythema.   Eyes:      General: No scleral icterus.        Right eye: No discharge.         Left eye: No discharge.      Extraocular Movements: Extraocular movements intact.      Conjunctiva/sclera: Conjunctivae normal.   Cardiovascular:      Rate and Rhythm: Normal rate and regular rhythm.      Pulses: Normal pulses.      Heart sounds: Normal heart sounds.   Pulmonary:      Effort: Pulmonary effort is normal. No respiratory distress.      Breath sounds: Normal breath sounds.   Abdominal:      Palpations: Abdomen is soft.      Tenderness: There is abdominal tenderness in the right lower quadrant, suprapubic area and left lower quadrant. There is no right CVA tenderness, left CVA tenderness, guarding or rebound.    Musculoskeletal:         General: No tenderness, deformity or signs of injury.      Cervical back: Normal range of motion and neck supple.   Skin:     General: Skin is dry.      Coloration: Skin is not jaundiced.      Findings: No erythema or rash.   Neurological:      General: No focal deficit present.      Mental Status: She is alert and oriented to person, place, and time. Mental status is at baseline.      Motor: No weakness.      Gait: Gait normal.   Psychiatric:         Mood and Affect: Mood normal.         Behavior: Behavior normal.         Thought Content: Thought content normal.         Vital Signs  ED Triage Vitals [05/15/24 1355]   Temperature Pulse Respirations Blood Pressure SpO2   98.5 °F (36.9 °C) 85 18 150/85 98 %      Temp Source Heart Rate Source Patient Position - Orthostatic VS BP Location FiO2 (%)   Oral Monitor Sitting Right arm --      Pain Score       --           Vitals:    05/15/24 1355 05/15/24 1559 05/15/24 1708   BP: 150/85 130/68 119/67   Pulse: 85  82   Patient Position - Orthostatic VS: Sitting           Visual Acuity      ED Medications  Medications   lactated ringers bolus 1,000 mL (0 mL Intravenous Stopped 5/15/24 1543)   metoclopramide (REGLAN) injection 10 mg (10 mg Intravenous Given 5/15/24 1445)       Diagnostic Studies  Results Reviewed       Procedure Component Value Units Date/Time    Urine culture [[id number]] Collected: 05/15/24 1554    Lab Status: Final result Specimen: Urine, Clean Catch Updated: 05/16/24 1739     Urine Culture 10,000-19,000 cfu/ml    Molecular Vaginal Panel [[id number]]  (Normal) Collected: 05/15/24 1557    Lab Status: Final result Specimen: Genital from Vaginal Updated: 05/16/24 1416     Bacterial Vaginosis Negative     Candida species Negative     Candida glabrata Negative     Bridgette krusei Negative     Trichomonas vaginalis Negative    Narrative:      This test has been performed using the FDA-approved BD MAX system for the qualitative detection of  bacterial vaginosis markers (Lactobacillus spp., Gardnerella vaginalis, Atopobium vaginae, Bacterial Vaginosis Associated Bacteria-2, Megasphaera-1), Candida spp. (C. albicans, C. tropicalis, C. parapsilosis, C. dubliniensis), Candida glabrata, Candida krusei, and Trichomonas vaginalis in vaginal swabs from patients who are symptomatic for vaginitis/vaginosis using polymerase chain reaction (PCR) methodology.    Negative PCR results do not preclude infection and should not be used as the sole basis for treatment or other patient management decisions.    Positive PCR results are indicative of infection, but do not necessarily indicate the presence of viable organisms and do not rule out co-infection with other bacteria or viruses.    As with all polymerase-chain reaction methodology, extremely low levels of target below the limit of detection may be detected, but results may not be reproducible.    All test results should be used as an adjunct to clinical observations and other information available to the provider.    Urine Microscopic [[id number]]  (Abnormal) Collected: 05/15/24 1554    Lab Status: Final result Specimen: Urine, Clean Catch Updated: 05/15/24 1643     RBC, UA 1-2 /hpf      WBC, UA None Seen /hpf      Epithelial Cells Occasional /hpf      Bacteria, UA Occasional /hpf      MUCUS THREADS Occasional     Transitional Epithelial Cells Present    Urine Macroscopic, POC [[id number]]  (Abnormal) Collected: 05/15/24 1554    Lab Status: Final result Specimen: Urine Updated: 05/15/24 1555     Color, UA Yellow     Clarity, UA Clear     pH, UA 7.0     Leukocytes, UA Negative     Nitrite, UA Negative     Protein, UA Negative mg/dl      Glucose, UA Negative mg/dl      Ketones, UA 80 (3+) mg/dl      Urobilinogen, UA 1.0 E.U./dl      Bilirubin, UA Negative     Occult Blood, UA Trace     Specific Gravity, UA 1.015    Narrative:      CLINITEK RESULT    POCT pregnancy, urine [[id number]]  (Abnormal) Resulted: 05/15/24 1554     Lab Status: Final result Updated: 05/15/24 1554     EXT Preg Test, Ur Positive     Control Valid    Basic metabolic panel [[id number]]  (Abnormal) Collected: 05/15/24 1441    Lab Status: Final result Specimen: Blood from Arm, Right Updated: 05/15/24 1508     Sodium 138 mmol/L      Potassium 3.3 mmol/L      Chloride 101 mmol/L      CO2 25 mmol/L      ANION GAP 12 mmol/L      BUN 7 mg/dL      Creatinine 0.71 mg/dL      Glucose 93 mg/dL      Calcium 9.6 mg/dL      eGFR 119 ml/min/1.73sq m     Narrative:      National Kidney Disease Foundation guidelines for Chronic Kidney Disease (CKD):     Stage 1 with normal or high GFR (GFR > 90 mL/min/1.73 square meters)    Stage 2 Mild CKD (GFR = 60-89 mL/min/1.73 square meters)    Stage 3A Moderate CKD (GFR = 45-59 mL/min/1.73 square meters)    Stage 3B Moderate CKD (GFR = 30-44 mL/min/1.73 square meters)    Stage 4 Severe CKD (GFR = 15-29 mL/min/1.73 square meters)    Stage 5 End Stage CKD (GFR <15 mL/min/1.73 square meters)  Note: GFR calculation is accurate only with a steady state creatinine    Hepatic function panel [[id number]]  (Abnormal) Collected: 05/15/24 1441    Lab Status: Final result Specimen: Blood from Arm, Right Updated: 05/15/24 1508     Total Bilirubin 0.64 mg/dL      Bilirubin, Direct 0.15 mg/dL      Alkaline Phosphatase 46 U/L      AST 12 U/L      ALT 13 U/L      Total Protein 7.1 g/dL      Albumin 4.4 g/dL     Lipase [[id number]]  (Abnormal) Collected: 05/15/24 1441    Lab Status: Final result Specimen: Blood from Arm, Right Updated: 05/15/24 1508     Lipase 9 u/L     CBC and differential [[id number]]  (Abnormal) Collected: 05/15/24 1441    Lab Status: Final result Specimen: Blood from Arm, Right Updated: 05/15/24 1445     WBC 13.08 Thousand/uL      RBC 4.40 Million/uL      Hemoglobin 13.8 g/dL      Hematocrit 39.5 %      MCV 90 fL      MCH 31.4 pg      MCHC 34.9 g/dL      RDW 12.2 %      MPV 9.6 fL      Platelets 236 Thousands/uL      nRBC 0 /100 WBCs       Segmented % 83 %      Immature Grans % 1 %      Lymphocytes % 11 %      Monocytes % 5 %      Eosinophils Relative 0 %      Basophils Relative 0 %      Absolute Neutrophils 10.91 Thousands/µL      Absolute Immature Grans 0.09 Thousand/uL      Absolute Lymphocytes 1.38 Thousands/µL      Absolute Monocytes 0.66 Thousand/µL      Eosinophils Absolute 0.01 Thousand/µL      Basophils Absolute 0.03 Thousands/µL                    US OB < 14 weeks with transvaginal   Final Result by Ronda Lucero MD (05/15 1702)      Single live intrauterine gestation at 6w5d (range +/-  0w3d  ).      ANDRÉS of 01/03/2025.         Workstation performed: [id number]                    Procedures  Procedures         ED Course  ED Course as of 05/17/24 0711   Wed May 15, 2024   1452 WBC(!): 13.08   1622 Urine Macroscopic, POC(!)  + ketones, trace blood. No evidence of infection.                                             Medical Decision Making  Patient is a 23-year-old female presenting with vomiting and lower abdominal pain in pregnancy.  All vitals are stable on arrival and she is in no acute distress.  Will obtain CBC to evaluate for leukocytosis and anemia, CMP for electrolyte abnormalities and ALEX, hepatic function panel and lipase for biliary abnormalities, UA for UTI. Will obtain US to evaluate for fetal viability given lower abd cramping. Will give liter of fluids, reglan, and PO challenge.    Leukocytosis of 13.08. Mild hypokalemia at 3.3. No other significant electrolyte abnormalities, biliary derangements, elevated lipase. UA has ketones consistent with dehydration and trace blood, no other evidence of infection. Sign out to ROSAMARIA CHAND pending US results and PO challenge. Patient stable at time of sign out.    Amount and/or Complexity of Data Reviewed  Labs: ordered. Decision-making details documented in ED Course.  Radiology: ordered.    Risk  Prescription drug management.             Disposition  Final diagnoses:   Nausea and vomiting  in pregnancy   Hypokalemia   Abnormal urine     Time reflects when diagnosis was documented in both MDM as applicable and the Disposition within this note       Time User Action Codes Description Comment    5/15/2024  4:36 PM Jammie Webb Add [O21.9] Nausea and vomiting in pregnancy     5/15/2024  5:28 PM BandaViola Add [E87.6] Hypokalemia     5/15/2024  5:28 PM BandaViola Add [R82.90] Abnormal urine           ED Disposition       ED Disposition   Discharge    Condition   Stable    Date/Time   Wed May 15, 2024  5:30 PM    Comment   Bijal Davis discharge to home/self care.                   Follow-up Information       Follow up With Specialties Details Why Contact Info Additional Information    Edward Langston,  Family Medicine Schedule an appointment as soon as possible for a visit  As needed [address] [facility] [address]  [phone]       Counts include 234 beds at the [facility] Obstetrics and Gynecology Schedule an appointment as soon as possible for a visit   [address] [place] [address] [phone] Counts include 234 beds at the [facility], [address] [place] [address], [address], [place], [place], [address] [phone]            Discharge Medication List as of 5/15/2024  5:30 PM        START taking these medications    Details   metoclopramide (Reglan) 10 mg tablet Take 1 tablet (10 mg total) by mouth every 6 (six) hours, Starting Wed 5/15/2024, Normal           CONTINUE these medications which have NOT CHANGED    Details   buPROPion (WELLBUTRIN XL) 150 mg 24 hr tablet Take 300 mg by mouth daily, Historical Med      escitalopram (LEXAPRO) 10 mg tablet Take 10 mg by mouth daily, Historical Med      Prenatal Vit-Fe Fumarate-FA (Prenatal Complete) 14-0.4 MG TABS Take 1 tablet by mouth daily, Starting Sun 4/28/2024, Normal             No discharge procedures on file.    PDMP Review       None            ED Provider  Electronically Signed  by             Jammie Webb PA-C  05/17/24 0711

## 2024-05-15 NOTE — ED CARE HANDOFF
Emergency Department Sign Out Note        Sign out and transfer of care from Jammie Webb PA-C. See Separate Emergency Department note.     The patient, Bijal Davis, was evaluated by the previous provider for vomiting during pregnancy.    Workup Completed:  Given Reglan and Fluids  CBC  BMP  Lipase  Hepatic panel  Urine  Urine pregnancy  Molecular vaginal panel    ED Course / Workup Pending (followup):  US    1638 Per sign out, if US looks good and can tolerate PO, she can be discharged.     1716 US OB < 14 weeks with transvaginal  Single live intrauterine gestation at 6w5d (range +/-  0w3d  ).     ANDRÉS of 01/03/2025.    1727 Patient tolerating PO. Will discharge.    1730    Informed patient of US findings. Will discharge. Patient agreeable.    The management plan was discussed in detail with the patient at bedside and all questions were answered.  Prior to discharge, we provided both verbal and written instructions.  We discussed with the patient the signs and symptoms for which to return to the emergency department.  All questions were answered and patient was comfortable with the plan of care and discharged to home.  Instructed the patient to follow up with the primary care provider and/or specialist provided and their written instructions.  The patient verbalized understanding of our discussion and plan of care, and agrees to return to the Emergency Department for concerns and progression of illness.    At discharge, I instructed the patient to:  -follow up with pcp  -take Reglan as prescribed for nausea and vomiting  -follow up with obgyn  -rest and drink plenty of fluids  -return to the ER if symptoms worsened or new symptoms arose  Patient agreed to this plan and was stable at time of discharge.                                     ED Course as of 05/15/24 1846   Wed May 15, 2024   1638 Per sign out, if US looks good and can tolerate PO, she can be discharged.    1716 US OB < 14 weeks with  transvaginal  Single live intrauterine gestation at 6w5d (range +/-  0w3d  ).     ANDRÉS of 01/03/2025.     1727 Patient tolerating PO     Procedures  Medical Decision Making  Problems Addressed:  Abnormal urine: acute illness or injury  Hypokalemia: acute illness or injury  Nausea and vomiting in pregnancy: acute illness or injury    Amount and/or Complexity of Data Reviewed  Independent Historian:      Details: Patient is historian  Labs: ordered.  Radiology: ordered. Decision-making details documented in ED Course.    Risk  Prescription drug management.            Disposition  Final diagnoses:   Nausea and vomiting in pregnancy   Hypokalemia   Abnormal urine     Time reflects when diagnosis was documented in both MDM as applicable and the Disposition within this note       Time User Action Codes Description Comment    5/15/2024  4:36 PM Jammie Webb Add [O21.9] Nausea and vomiting in pregnancy     5/15/2024  5:28 PM Viola Banda Add [E87.6] Hypokalemia     5/15/2024  5:28 PM Viola Banda Add [R82.90] Abnormal urine           ED Disposition       ED Disposition   Discharge    Condition   Stable    Date/Time   Wed May 15, 2024  5:30 PM    Comment   Bijal Davis discharge to home/self care.                   Follow-up Information       Follow up With Specialties Details Why Contact Info Additional Information    Edward Langston,  Family Medicine Schedule an appointment as soon as possible for a visit  As needed [address] [facility] [address]  [phone]       [facility] Obstetrics and Gynecology Schedule an appointment as soon as possible for a visit   [address] [place] [address] [phone] [facility], [address] [place] [address], [address], [place], [place], [address] [phone]          Discharge Medication List as of 5/15/2024  5:30 PM        START taking these medications    Details    metoclopramide (Reglan) 10 mg tablet Take 1 tablet (10 mg total) by mouth every 6 (six) hours, Starting Wed 5/15/2024, Normal           CONTINUE these medications which have NOT CHANGED    Details   buPROPion (WELLBUTRIN XL) 150 mg 24 hr tablet Take 300 mg by mouth daily, Historical Med      escitalopram (LEXAPRO) 10 mg tablet Take 10 mg by mouth daily, Historical Med      Prenatal Vit-Fe Fumarate-FA (Prenatal Complete) 14-0.4 MG TABS Take 1 tablet by mouth daily, Starting Sun 4/28/2024, Normal           No discharge procedures on file.       ED Provider  Electronically Signed by     Viola Banda PA-C  05/15/24 3913

## 2024-05-15 NOTE — ED NOTES
Pt tolerated 2 saltine crackers and appx 2oz of water - pt reports feeling nauseous although no active vomiting      Estefanía Law RN  05/15/24 2526

## 2024-05-15 NOTE — DISCHARGE INSTRUCTIONS
DISCHARGE INSTRUCTIONS:    FOLLOW UP WITH YOUR PRIMARY CARE PROVIDER OR THE RECOMMENDED HEALTH CLINIC. MAKE AN APPOINTMENT TO BE SEEN.     TAKE MEDICATION AS PRESCRIBED. IF RASH, SHORTNESS OF BREATH OR TROUBLE SWALLOWING, STOP TAKING THE MEDICATION AND BE SEEN.     FOLLOW UP WITH OBGYN.    REST AND DRINK PLENTY OF FLUIDS.    CONTINUE TAKING PRENATAL VITAMINS.    IF SYMPTOMS WORSEN OR NEW SYMPTOMS ARISE, RETURN TO THE ER TO BE SEEN.

## 2024-05-16 LAB
BACTERIA UR CULT: NORMAL
C GLABRATA DNA VAG QL NAA+PROBE: NEGATIVE
C KRUSEI DNA VAG QL NAA+PROBE: NEGATIVE
CANDIDA SP 6 PNL VAG NAA+PROBE: NEGATIVE
T VAGINALIS DNA VAG QL NAA+PROBE: NEGATIVE
VAGINOSIS/ITIS DNA PNL VAG PROBE+SIG AMP: NEGATIVE

## 2024-06-03 ENCOUNTER — ULTRASOUND (OUTPATIENT)
Dept: OBGYN CLINIC | Facility: CLINIC | Age: 24
End: 2024-06-03

## 2024-06-03 VITALS
BODY MASS INDEX: 29.52 KG/M2 | DIASTOLIC BLOOD PRESSURE: 64 MMHG | SYSTOLIC BLOOD PRESSURE: 116 MMHG | WEIGHT: 166.6 LBS | HEIGHT: 63 IN

## 2024-06-03 DIAGNOSIS — Z36.9 ANTENATAL SCREENING ENCOUNTER: ICD-10-CM

## 2024-06-03 DIAGNOSIS — O36.80X0 PREGNANCY WITH UNCERTAIN FETAL VIABILITY, SINGLE OR UNSPECIFIED FETUS: Primary | ICD-10-CM

## 2024-06-04 ENCOUNTER — TELEPHONE (OUTPATIENT)
Age: 24
End: 2024-06-04

## 2024-06-10 ENCOUNTER — TELEPHONE (OUTPATIENT)
Age: 24
End: 2024-06-10

## 2024-06-21 ENCOUNTER — TELEPHONE (OUTPATIENT)
Age: 24
End: 2024-06-21

## 2024-06-24 ENCOUNTER — INITIAL PRENATAL (OUTPATIENT)
Dept: OBGYN CLINIC | Facility: CLINIC | Age: 24
End: 2024-06-24
Payer: COMMERCIAL

## 2024-06-24 ENCOUNTER — APPOINTMENT (OUTPATIENT)
Dept: LAB | Facility: CLINIC | Age: 24
End: 2024-06-24
Payer: COMMERCIAL

## 2024-06-24 VITALS
WEIGHT: 161 LBS | SYSTOLIC BLOOD PRESSURE: 112 MMHG | BODY MASS INDEX: 28.53 KG/M2 | HEIGHT: 63 IN | DIASTOLIC BLOOD PRESSURE: 64 MMHG

## 2024-06-24 VITALS
WEIGHT: 161 LBS | SYSTOLIC BLOOD PRESSURE: 112 MMHG | BODY MASS INDEX: 28.53 KG/M2 | DIASTOLIC BLOOD PRESSURE: 64 MMHG | HEIGHT: 63 IN

## 2024-06-24 DIAGNOSIS — Z3A.12 12 WEEKS GESTATION OF PREGNANCY: ICD-10-CM

## 2024-06-24 DIAGNOSIS — Z12.4 CERVICAL CANCER SCREENING: ICD-10-CM

## 2024-06-24 DIAGNOSIS — Z34.81 SUPERVISION OF NORMAL INTRAUTERINE PREGNANCY IN MULTIGRAVIDA IN FIRST TRIMESTER: Primary | ICD-10-CM

## 2024-06-24 DIAGNOSIS — Z11.3 SCREENING EXAMINATION FOR STD (SEXUALLY TRANSMITTED DISEASE): ICD-10-CM

## 2024-06-24 DIAGNOSIS — O99.331 TOBACCO USE AFFECTING PREGNANCY IN FIRST TRIMESTER, ANTEPARTUM: Primary | ICD-10-CM

## 2024-06-24 DIAGNOSIS — Z34.81 SUPERVISION OF NORMAL INTRAUTERINE PREGNANCY IN MULTIGRAVIDA IN FIRST TRIMESTER: ICD-10-CM

## 2024-06-24 DIAGNOSIS — Z13.71 SCREENING FOR GENETIC DISEASE CARRIER STATUS: ICD-10-CM

## 2024-06-24 LAB
ABO GROUP BLD: NORMAL
BACTERIA UR QL AUTO: ABNORMAL /HPF
BASOPHILS # BLD AUTO: 0.02 THOUSANDS/ÂΜL (ref 0–0.1)
BASOPHILS NFR BLD AUTO: 0 % (ref 0–1)
BILIRUB UR QL STRIP: NEGATIVE
BLD GP AB SCN SERPL QL: NEGATIVE
CLARITY UR: CLEAR
COLOR UR: YELLOW
EOSINOPHIL # BLD AUTO: 0.03 THOUSAND/ÂΜL (ref 0–0.61)
EOSINOPHIL NFR BLD AUTO: 0 % (ref 0–6)
ERYTHROCYTE [DISTWIDTH] IN BLOOD BY AUTOMATED COUNT: 12.3 % (ref 11.6–15.1)
GLUCOSE UR STRIP-MCNC: NEGATIVE MG/DL
HCT VFR BLD AUTO: 37.6 % (ref 34.8–46.1)
HGB BLD-MCNC: 12.6 G/DL (ref 11.5–15.4)
HGB UR QL STRIP.AUTO: NEGATIVE
IMM GRANULOCYTES # BLD AUTO: 0.05 THOUSAND/UL (ref 0–0.2)
IMM GRANULOCYTES NFR BLD AUTO: 1 % (ref 0–2)
KETONES UR STRIP-MCNC: ABNORMAL MG/DL
LEUKOCYTE ESTERASE UR QL STRIP: NEGATIVE
LYMPHOCYTES # BLD AUTO: 1.58 THOUSANDS/ÂΜL (ref 0.6–4.47)
LYMPHOCYTES NFR BLD AUTO: 16 % (ref 14–44)
MCH RBC QN AUTO: 31 PG (ref 26.8–34.3)
MCHC RBC AUTO-ENTMCNC: 33.5 G/DL (ref 31.4–37.4)
MCV RBC AUTO: 92 FL (ref 82–98)
MONOCYTES # BLD AUTO: 0.37 THOUSAND/ÂΜL (ref 0.17–1.22)
MONOCYTES NFR BLD AUTO: 4 % (ref 4–12)
MUCOUS THREADS UR QL AUTO: ABNORMAL
NEUTROPHILS # BLD AUTO: 7.83 THOUSANDS/ÂΜL (ref 1.85–7.62)
NEUTS SEG NFR BLD AUTO: 79 % (ref 43–75)
NITRITE UR QL STRIP: NEGATIVE
NON-SQ EPI CELLS URNS QL MICRO: ABNORMAL /HPF
NRBC BLD AUTO-RTO: 0 /100 WBCS
PH UR STRIP.AUTO: 6.5 [PH]
PLATELET # BLD AUTO: 223 THOUSANDS/UL (ref 149–390)
PMV BLD AUTO: 11 FL (ref 8.9–12.7)
PROT UR STRIP-MCNC: ABNORMAL MG/DL
RBC # BLD AUTO: 4.07 MILLION/UL (ref 3.81–5.12)
RBC #/AREA URNS AUTO: ABNORMAL /HPF
RH BLD: POSITIVE
RUBV IGG SERPL IA-ACNC: 22.3 IU/ML
SP GR UR STRIP.AUTO: 1.03 (ref 1–1.03)
SPECIMEN EXPIRATION DATE: NORMAL
UROBILINOGEN UR STRIP-ACNC: <2 MG/DL
WBC # BLD AUTO: 9.88 THOUSAND/UL (ref 4.31–10.16)
WBC #/AREA URNS AUTO: ABNORMAL /HPF

## 2024-06-24 PROCEDURE — 85025 COMPLETE CBC W/AUTO DIFF WBC: CPT

## 2024-06-24 PROCEDURE — 87491 CHLMYD TRACH DNA AMP PROBE: CPT | Performed by: OBSTETRICS & GYNECOLOGY

## 2024-06-24 PROCEDURE — 99213 OFFICE O/P EST LOW 20 MIN: CPT | Performed by: OBSTETRICS & GYNECOLOGY

## 2024-06-24 PROCEDURE — 36415 COLL VENOUS BLD VENIPUNCTURE: CPT

## 2024-06-24 PROCEDURE — 86850 RBC ANTIBODY SCREEN: CPT

## 2024-06-24 PROCEDURE — 86900 BLOOD TYPING SEROLOGIC ABO: CPT

## 2024-06-24 PROCEDURE — 87086 URINE CULTURE/COLONY COUNT: CPT

## 2024-06-24 PROCEDURE — 86901 BLOOD TYPING SEROLOGIC RH(D): CPT

## 2024-06-24 PROCEDURE — 87340 HEPATITIS B SURFACE AG IA: CPT

## 2024-06-24 PROCEDURE — 87591 N.GONORRHOEAE DNA AMP PROB: CPT | Performed by: OBSTETRICS & GYNECOLOGY

## 2024-06-24 PROCEDURE — G0145 SCR C/V CYTO,THINLAYER,RESCR: HCPCS | Performed by: OBSTETRICS & GYNECOLOGY

## 2024-06-24 PROCEDURE — 83020 HEMOGLOBIN ELECTROPHORESIS: CPT

## 2024-06-24 PROCEDURE — 86706 HEP B SURFACE ANTIBODY: CPT

## 2024-06-24 PROCEDURE — 81001 URINALYSIS AUTO W/SCOPE: CPT

## 2024-06-24 PROCEDURE — 86780 TREPONEMA PALLIDUM: CPT

## 2024-06-24 PROCEDURE — 87389 HIV-1 AG W/HIV-1&-2 AB AG IA: CPT

## 2024-06-24 PROCEDURE — 86762 RUBELLA ANTIBODY: CPT

## 2024-06-24 PROCEDURE — 99211 OFF/OP EST MAY X REQ PHY/QHP: CPT

## 2024-06-24 PROCEDURE — 86803 HEPATITIS C AB TEST: CPT

## 2024-06-24 RX ORDER — PYRIDOXINE HCL (VITAMIN B6) 25 MG
25 TABLET ORAL DAILY
Qty: 30 TABLET | Refills: 1 | Status: SHIPPED | OUTPATIENT
Start: 2024-06-24

## 2024-06-24 RX ORDER — METOCLOPRAMIDE 10 MG/1
10 TABLET ORAL 4 TIMES DAILY
Qty: 60 TABLET | Refills: 0 | Status: SHIPPED | OUTPATIENT
Start: 2024-06-24

## 2024-06-24 RX ORDER — NICOTINE 21 MG/24HR
1 PATCH, TRANSDERMAL 24 HOURS TRANSDERMAL EVERY 24 HOURS
Qty: 28 PATCH | Refills: 3 | Status: SHIPPED | OUTPATIENT
Start: 2024-06-24

## 2024-06-24 NOTE — PROGRESS NOTES
OB INTAKE INTERVIEW 2024    Patient is 23 y.o. who presents for OB intake at 12w3d.  She is accompanied by  grandmother  during this encounter.  The father of her baby (Elias Valdivia) is involved in the pregnancy and he is 21 years old.      Patient's last menstrual period was 2024 (exact date).  Ultrasound: Measured 9 weeks 3 days on 6/3/2024  Estimated Date of Delivery: 1/3/25 changed by dating ultrasound.    Signs/Symptoms of Pregnancy  Current pregnancy symptoms: breast tenderness, fatigue, nausea, and vomiting  Constipation YES  Headaches no  Cramping YES  Spotting no  PICA cravings no    Diabetes-  Body mass index is 28.52 kg/m².  If patient has 1 or more, please order early 1 hour GTT  History of GDM no  BMI >35 no  History of PCOS or current metformin use no  History of LGA/macrosomic infant (4000g/9lbs) no    If patient has 2 or more, please order early 1 hour GTT  BMI>30 no  AMA no  First degree relative with type 2 diabetes no  History of chronic HTN, hyperlipidemia, elevated A1C no  High risk race (, , ,  or ) no    Hypertension- if you answer yes to any of the following, please order baseline preeclampsia labs (cbc, comprehensive metabolic panel, urine protein creatinine ratio, uric acid)  History of of chronic HTN no  History of gestational HTN no  History of preeclampsia, eclampsia, or HELLP syndrome no  History of diabetes no  History of lupus, autoimmune disease, kidney disease no    Thyroid- if yes order TSH with reflex T4  History of thyroid disease no    Bleeding Disorder or Hx of DVT-patient or first degree relative with history of. Order the following if not done previously.   (Factor V, antithrombin III, prothrombin gene mutation, protein C and S Ag, lupus anticoagulant, anticardiolipin, beta-2 glycoprotein)   no    OB/GYN-  History of abnormal pap smear YES-2021 ASCUS negative HPV       Date of last pap smear  2021  History of HPV no  History of Herpes/HSV no  History of other STI (gonorrhea, chlamydia, trich) YES-CT  History of prior  YESx1  History of prior  no  History of  delivery prior to 36 weeks 6 days no  History of blood transfusion no  Ok for blood transfusion YES    Substance screening-   History of tobacco use YES  Currently using tobacco YES  Currently using alcohol no  Presently using drugs no  Past drug use  no  IV drug use- no  Partner drug use no  Parent/Family drug use no  Substance Use Screen Level Low risk    MRSA Screening-   Does the pt have a hx of MRSA? no    Immunizations:  Discussed Tdap vaccine:  YES  Discussed COVID Vaccine:  YES    Genetic/MFM-  Do you or your partner have a history of any of the following in yourselves or first degree relatives?  Cystic fibrosis no  Spinal muscular atrophy no  Hemoglobinopathy/Sickle Cell/Thalassemia no  Fragile X Intellectual Disability no    If yes, discuss Carrier Screening and recommend consultation with MFM/Genetic Counseling and place specific [facility] Referral for.    If no, discuss Carrier Screening being completed once in a lifetime as a standard of care lab test. Place orders for Cystic Fibrosis Gene Test (OGZ186) and Spinal Muscular Atrophy DNA (QZV0362).  Patient was informed that prior authorization needs to be completed for these tests and this may take 7-10 business days.  Patient does desire testing for Cystic Fibrosis and Spinal Muscular Atrophy.    Appointment for Nuchal Translucency Ultrasound at [facility]  Has not been scheduled.  Reminded patient to call to schedule.     Interview education  [facility] Pregnancy Essentials Book reviewed, discussed and attached to their AVS:  YES    Nurse/Family Partnership-patient may qualify YES; referral placed YES     Prenatal lab work scripts YES    Extra labs ordered: Cystic Fibrosis gene test, Spinal muscular atrophy DNA, and Hgb Fractionation Cascade    Aspirin/Preeclampsia  Screen    Risk Level Risk Factor Recommendation   LOW Prior Uncomplicated full-term delivery YES No Aspirin recommendation        MODERATE Nulliparity no Recommend low-dose aspirin if     BMI>30 no 2 or more moderate risk factors    Family History Preeclampsia (mother/sister) no     35yr old or greater no      or Low Socioeconomic no     IVF Pregnancy  no     Personal History Risks (low birth weight, prior adverse preg outcome, >10yr preg interval) no         HIGH History of Preeclampsia no Recommend low-dose aspirin if     Multifetal gestation no 1 or more high risk factors    Chronic HTN no     Type 1 or 2 Diabetes no     Renal Disease no     Autoimmune Disease  no      Contraindications to ASA therapy:  NSAID/ ASA allergy: no  Nasal polyps: no  Asthma with history of ASA induced bronchospasm: no  Relative contraindications:  History of GI bleed: no  Active peptic ulcer disease: no  Severe hepatic dysfunction: no    Patient does not meet recommendation to take ASA 162mg during this pregnancy from 12-36wks to lower her risk of preeclampsia.      Mental Health:  History of depression: YES  History of anxiety: YES  EPDS Screen:  Positive    Score:  12-Patient answered 1 (hardly ever) to thoughts of self harm.  Denies thoughts of harming herself at this time.  Instructed patient to go to ED if she has thoughts of hurting herself again.  Patient verbalized understanding.  States she was unable to take medication due to nausea and vomiting. Was prescribed Reglan but lost prescription when she moved.  Has a physiatrist (Earnestine Lopez) at [address]  Has not seen recently due to lack of health insurance, but now is insured.  Patient called office during intake appt to set up an appt and left a message for them to return her call.  Dr. Loomis prescribed medication for nausea and vomiting so patient can resume taking Wellbutrin and Lexapo.  Patient states the medication does help when  "she takes it.         The patient has a history now or in prior pregnancy notable for: Currently vaping \"all day long\"  States she always has her vape in her hand.    Details that I feel the provider should be aware of: See note for mental health above.  No additional concerns.    PN1 visit scheduled. The patient was oriented to our practice and the navigator role.  Reviewed delivering physicians and [facility] for delivery. All questions were answered.    Interviewed by: MISSY CASTANEDA LPN    "

## 2024-06-24 NOTE — PATIENT INSTRUCTIONS
Congratulations on your pregnancy!  We thank you for allowing us to participate in your care.    NEXT STEPS    Go to the lab to have your prenatal blood work competed, if you have not already done so.  We ask that you have this blood work done prior to your first routine prenatal appointment.  There is a listing of [facility] Laboratories and locations in your prenatal folder. You may also visit [email or web address] or call [phone].   Please be aware that some insurance companies may require you to go to a specific lab ([facility] or [facility]). You can verify this by contacting your insurance company.   If you have decided to be screened for CF and SMA genetic testing, these tests require prior authorization and scheduling.  Prior Authorization is not a guarantee of payment. There may be out of pocket expenses that includes copays, deductibles and or coinsurance for your individual plan.  Please call [phone] if our team has not contacted you in 7 business days.  If you have decided to have genetic testing done at Maternal Fetal Medicine, that will be scheduled by [facility]. You may have already scheduled this appointment.  If not, please call their office to schedule this appointment.  Based on the referral placed by our office, they will know how to schedule you appropriately.    Contact information for Maternal Fetal Medicine is located in your prenatal folder. The main phone number to their office is [phone]..   Return to our office for your first routine prenatal visit.   Some offices have multiple locations. Always check the address of your appointment to ensure you are going to the correct office.   If you experience any problems or concerns, call the office directly.  Remember to only use [employer] It In Person for none urgent concerns or questions.  Our doctors deliver at [facility] in [place]. The address is provided below.     [facility]   [address] [place] [address]    Click  on the links below to review our Pregnancy Essential Guide.  [facility] Pregnancy Essentials Guide  . [facility] ([email or web address])     Click on the link below to review [facility] locations.  [facility] Lab Locations    [employer] resource  [employer] is a tool to connect you to community resources you may need.    Thank you,  Ana SANTIAGO LPN  OB Nurse Navigator

## 2024-06-24 NOTE — PROGRESS NOTES
"Assessment & Plan  23 y.o.  at 12w3d presenting for routine prenatal visit.       Problem List       Mild intermittent asthma without complication    Overview     Formatting of this note might be different from the original.   ICD10 clean up      Last Assessment & Plan:    Formatting of this note might be different from the original.   Uses albuterol 1-2 times a week.         Severe episode of recurrent major depressive disorder, without psychotic features (HCC)    Tobacco use affecting pregnancy in first trimester, antepartum    Overview     1 pack lasts 3-4 days. Discussed cessation.  Vapes nicotine daily         12 weeks gestation of pregnancy    Overview     Prenatal labs ordered  [ ]MSAFP   [ ]Flu vaccine ,[ ] TDAP vaccine   [ ]Delivery consent  [ ]Contraception-declines              Other Visit Diagnoses       Cervical cancer screening        Screening examination for STD (sexually transmitted disease)              ____________________________________________________________  Subjective  She is without complaint.   She denies contractions, loss of fluid, or vaginal bleeding.       Objective  /64   Ht 5' 3\" (1.6 m)   Wt 73 kg (161 lb)   LMP 2024 (Exact Date)   BMI 28.52 kg/m²   FHR: 160's via doppler    Physical Exam  Constitutional:       Appearance: She is well-developed.   Cardiovascular:      Rate and Rhythm: Normal rate and regular rhythm.      Heart sounds: Normal heart sounds. No murmur heard.     No friction rub. No gallop.   Pulmonary:      Effort: Pulmonary effort is normal. No respiratory distress.      Breath sounds: No wheezing.   Abdominal:      Palpations: Abdomen is soft.      Tenderness: There is no abdominal tenderness.   Musculoskeletal:         General: No tenderness.   Neurological:      Mental Status: She is alert and oriented to person, place, and time.   Vitals reviewed.          Patient's Active Problem List  Patient Active Problem List   Diagnosis    Mild " intermittent asthma without complication    Severe episode of recurrent major depressive disorder, without psychotic features (HCC)    Tobacco use affecting pregnancy in first trimester, antepartum    12 weeks gestation of pregnancy           Raji Byrne MD  6/24/2024  1:19 PM

## 2024-06-25 LAB
BACTERIA UR CULT: NORMAL
HBV SURFACE AB SER-ACNC: 4.47 MIU/ML
HBV SURFACE AG SER QL: NORMAL
HCV AB SER QL: NORMAL
HIV 1+2 AB+HIV1 P24 AG SERPL QL IA: NORMAL
HIV 2 AB SERPL QL IA: NORMAL
HIV1 AB SERPL QL IA: NORMAL
HIV1 P24 AG SERPL QL IA: NORMAL
TREPONEMA PALLIDUM IGG+IGM AB [PRESENCE] IN SERUM OR PLASMA BY IMMUNOASSAY: NORMAL

## 2024-06-26 ENCOUNTER — ROUTINE PRENATAL (OUTPATIENT)
Dept: PERINATAL CARE | Facility: CLINIC | Age: 24
End: 2024-06-26
Payer: COMMERCIAL

## 2024-06-26 VITALS
WEIGHT: 162 LBS | SYSTOLIC BLOOD PRESSURE: 118 MMHG | BODY MASS INDEX: 28.7 KG/M2 | DIASTOLIC BLOOD PRESSURE: 60 MMHG | HEIGHT: 63 IN | HEART RATE: 70 BPM

## 2024-06-26 DIAGNOSIS — Z36.82 ENCOUNTER FOR (NT) NUCHAL TRANSLUCENCY SCAN: ICD-10-CM

## 2024-06-26 DIAGNOSIS — Z36.9 ANTENATAL SCREENING ENCOUNTER: ICD-10-CM

## 2024-06-26 DIAGNOSIS — Z36.0 ENCOUNTER FOR ANTENATAL SCREENING FOR CHROMOSOMAL ANOMALIES: ICD-10-CM

## 2024-06-26 DIAGNOSIS — Z3A.12 12 WEEKS GESTATION OF PREGNANCY: Primary | ICD-10-CM

## 2024-06-26 LAB
C TRACH DNA SPEC QL NAA+PROBE: NEGATIVE
N GONORRHOEA DNA SPEC QL NAA+PROBE: NEGATIVE

## 2024-06-26 PROCEDURE — 76813 OB US NUCHAL MEAS 1 GEST: CPT | Performed by: STUDENT IN AN ORGANIZED HEALTH CARE EDUCATION/TRAINING PROGRAM

## 2024-06-26 PROCEDURE — 36415 COLL VENOUS BLD VENIPUNCTURE: CPT | Performed by: STUDENT IN AN ORGANIZED HEALTH CARE EDUCATION/TRAINING PROGRAM

## 2024-06-26 PROCEDURE — 99243 OFF/OP CNSLTJ NEW/EST LOW 30: CPT | Performed by: STUDENT IN AN ORGANIZED HEALTH CARE EDUCATION/TRAINING PROGRAM

## 2024-06-26 NOTE — PROGRESS NOTES
Patient chose to have [facility] [id number] Non-Invasive Prenatal Screen [id number] PLUS w/ SCA, WITH fetal sex.  Patient choose to be billed through insurance.     Patient given brochure and is aware [facility] will contact patient's insurance and coordinate coverage.  Provided [facility] contact information. General inquiries [phone], Cost estimates [phone] and [facility] Billing [phone]. Website [email or web address].     Blood collection tubes labeled with patient identifiers (name, medical record number, and date of birth).     Filled out [facility] order form. Patient chose to have blood drawn in our office at time of visit. NIPS was drawn from right arm with a straight needle by Marla PATINO .      Maternal Fetal Medicine will have results in approximately 5-7 business days and will call patient or notify via [employer].  Patient aware viewing lab result online will reveal fetal sex if ordered.    Patient verbalized understanding of all instructions and no questions at this time.

## 2024-06-26 NOTE — LETTER
"2024     Rhonda Loomis MD  [address] [address]    Patient: Bijal Davis   YOB: 2000   Date of Visit: 2024       Dear Dr. Loomis:    Thank you for referring Bijal Davis to me for evaluation. Below are my notes for this consultation.    If you have questions, please do not hesitate to call me. I look forward to following your patient along with you.         Sincerely,        Grace Green MD        CC: No Recipients    Grace Green MD  2024  2:34 PM  Sign when Signing Visit  [facility]: Ms. Davis was seen today for nuchal translucency ultrasound.  See ultrasound report under \"OB Procedures\" tab.        Physical Exam  Constitutional:       General: She is not in acute distress.     Appearance: Normal appearance.   HENT:      Head: Normocephalic and atraumatic.   Eyes:      Extraocular Movements: Extraocular movements intact.   Cardiovascular:      Rate and Rhythm: Normal rate.   Pulmonary:      Effort: Pulmonary effort is normal. No respiratory distress.   Skin:     Findings: No erythema or rash.   Neurological:      Mental Status: She is alert and oriented to person, place, and time.   Psychiatric:         Mood and Affect: Mood normal.         Behavior: Behavior normal.         Please don't hesitate to contact our office with any concerns or questions.  -Grace Green MD         "

## 2024-06-26 NOTE — PROGRESS NOTES
"[facility]: Ms. Davis was seen today for nuchal translucency ultrasound.  See ultrasound report under \"OB Procedures\" tab.        Physical Exam  Constitutional:       General: She is not in acute distress.     Appearance: Normal appearance.   HENT:      Head: Normocephalic and atraumatic.   Eyes:      Extraocular Movements: Extraocular movements intact.   Cardiovascular:      Rate and Rhythm: Normal rate.   Pulmonary:      Effort: Pulmonary effort is normal. No respiratory distress.   Skin:     Findings: No erythema or rash.   Neurological:      Mental Status: She is alert and oriented to person, place, and time.   Psychiatric:         Mood and Affect: Mood normal.         Behavior: Behavior normal.         Please don't hesitate to contact our office with any concerns or questions.  -Grace Green MD      "

## 2024-06-27 LAB
LAB AP GYN PRIMARY INTERPRETATION: NORMAL
LAB AP LMP: NORMAL
Lab: NORMAL

## 2024-06-28 LAB
HGB A MFR BLD: 2.7 % (ref 1.8–3.2)
HGB A MFR BLD: 97.3 % (ref 96.4–98.8)
HGB F MFR BLD: 0 % (ref 0–2)
HGB FRACT BLD-IMP: NORMAL
HGB S MFR BLD: 0 %

## 2024-06-30 LAB
CFDNA.FET/CFDNA.TOTAL SFR FETUS: NORMAL %
CITATION REF LAB TEST: NORMAL
FET 13+18+21+X+Y ANEUP PLAS.CFDNA: NEGATIVE
FET CHR 21 TS PLAS.CFDNA QL: NEGATIVE
FET CHR 21 TS PLAS.CFDNA QL: NEGATIVE
FET MS X RISK WBC.DNA+CFDNA QL: NOT DETECTED
FET SEX PLAS.CFDNA DOSAGE CFDNA: NORMAL
FET TS 13 RISK PLAS.CFDNA QL: NEGATIVE
FET X + Y ANEUP RISK PLAS.CFDNA SEQ-IMP: NOT DETECTED
GA EST FROM CONCEPTION DATE: NORMAL D
GESTATIONAL AGE > 9:: YES
LAB DIRECTOR NAME PROVIDER: NORMAL
LAB DIRECTOR NAME PROVIDER: NORMAL
LABORATORY COMMENT REPORT: NORMAL
LIMITATIONS OF THE TEST: NORMAL
NEGATIVE PREDICTIVE VALUE: NORMAL
PERFORMANCE CHARACTERISTICS: NORMAL
POSITIVE PREDICTIVE VALUE: NORMAL
REF LAB TEST METHOD: NORMAL
SL AMB NOTE:: NORMAL
TEST PERFORMANCE INFO SPEC: NORMAL

## 2024-08-01 ENCOUNTER — TELEPHONE (OUTPATIENT)
Facility: HOSPITAL | Age: 24
End: 2024-08-01

## 2024-08-01 NOTE — TELEPHONE ENCOUNTER
Left voicemail informing patient of the change for her upcoming Maternal Fetal Medicine appointment on 8/21. Requested she give our office a call back at [phone] with any questions or if she would need to reschedule.

## 2024-08-20 ENCOUNTER — TELEPHONE (OUTPATIENT)
Dept: OBGYN CLINIC | Facility: CLINIC | Age: 24
End: 2024-08-20

## 2024-08-20 NOTE — TELEPHONE ENCOUNTER
Reviewed chart for second trimester assessment.  Patient has not had any routine prenatal appt in the office and all appts scheduled were cancelled.  Looks as though patient has contacted another practice for pretnal care.  Left message on voicemail for patient to contact office to confirm.

## 2024-08-22 ENCOUNTER — ROUTINE PRENATAL (OUTPATIENT)
Age: 24
End: 2024-08-22
Payer: COMMERCIAL

## 2024-08-22 VITALS
DIASTOLIC BLOOD PRESSURE: 60 MMHG | WEIGHT: 166.4 LBS | BODY MASS INDEX: 29.48 KG/M2 | HEART RATE: 92 BPM | SYSTOLIC BLOOD PRESSURE: 118 MMHG | HEIGHT: 63 IN

## 2024-08-22 DIAGNOSIS — O99.331 TOBACCO USE AFFECTING PREGNANCY IN FIRST TRIMESTER, ANTEPARTUM: ICD-10-CM

## 2024-08-22 DIAGNOSIS — Z36.86 ENCOUNTER FOR ANTENATAL SCREENING FOR CERVICAL LENGTH: ICD-10-CM

## 2024-08-22 DIAGNOSIS — Z36.3 ENCOUNTER FOR ANTENATAL SCREENING FOR MALFORMATION: ICD-10-CM

## 2024-08-22 DIAGNOSIS — Z3A.20 20 WEEKS GESTATION OF PREGNANCY: Primary | ICD-10-CM

## 2024-08-22 PROCEDURE — 76805 OB US >/= 14 WKS SNGL FETUS: CPT | Performed by: OBSTETRICS & GYNECOLOGY

## 2024-08-22 PROCEDURE — 76817 TRANSVAGINAL US OBSTETRIC: CPT | Performed by: OBSTETRICS & GYNECOLOGY

## 2024-08-22 PROCEDURE — 99213 OFFICE O/P EST LOW 20 MIN: CPT | Performed by: OBSTETRICS & GYNECOLOGY

## 2024-08-22 RX ORDER — BUPROPION HYDROCHLORIDE 300 MG/1
300 TABLET ORAL EVERY MORNING
COMMUNITY
Start: 2024-07-30

## 2024-08-22 NOTE — PROGRESS NOTES
"[facility]: Bijal Davis was seen today for anatomic survey and cervical length screening ultrasound.  See ultrasound report under \"OB Procedures\" tab.   Please don't hesitate to contact our office with any concerns or questions.  -Ratna Chung MD      "

## 2024-08-22 NOTE — PROGRESS NOTES
Ultrasound Probe Disinfection    A transvaginal ultrasound was performed.   Prior to use, disinfection was performed with High Level Disinfection Process ([employer]).  Probe serial number [id number] was used.    Isabella Thomas  08/22/24  12:53 PM

## 2024-08-28 ENCOUNTER — PATIENT MESSAGE (OUTPATIENT)
Dept: OBGYN CLINIC | Facility: CLINIC | Age: 24
End: 2024-08-28

## 2024-08-28 DIAGNOSIS — Z34.83 SUPERVISION OF NORMAL INTRAUTERINE PREGNANCY IN MULTIGRAVIDA IN THIRD TRIMESTER: Primary | ICD-10-CM

## 2024-08-28 NOTE — TELEPHONE ENCOUNTER
Called patient for second trimester assessment.  Left message on voicemail to return call.  Patient does not have any scheduled appts.

## 2024-10-15 NOTE — PATIENT COMMUNICATION
Called patient.  Scheduled ezio for 10/28/2024 at 4 pm with Dr. Loomis and ordered 28 week labs.  Patient states she will have blood work done tomorrow.

## 2024-10-28 ENCOUNTER — APPOINTMENT (OUTPATIENT)
Dept: LAB | Facility: CLINIC | Age: 24
End: 2024-10-28
Payer: COMMERCIAL

## 2024-10-28 ENCOUNTER — ROUTINE PRENATAL (OUTPATIENT)
Dept: OBGYN CLINIC | Facility: CLINIC | Age: 24
End: 2024-10-28
Payer: COMMERCIAL

## 2024-10-28 VITALS
WEIGHT: 162.8 LBS | OXYGEN SATURATION: 100 % | BODY MASS INDEX: 28.84 KG/M2 | SYSTOLIC BLOOD PRESSURE: 100 MMHG | HEIGHT: 63 IN | DIASTOLIC BLOOD PRESSURE: 74 MMHG | HEART RATE: 87 BPM

## 2024-10-28 DIAGNOSIS — O99.333: ICD-10-CM

## 2024-10-28 DIAGNOSIS — O09.33 INSUFFICIENT PRENATAL CARE IN THIRD TRIMESTER: ICD-10-CM

## 2024-10-28 DIAGNOSIS — O09.93 ENCOUNTER FOR SUPERVISION OF HIGH RISK PREGNANCY IN THIRD TRIMESTER, ANTEPARTUM: Primary | ICD-10-CM

## 2024-10-28 DIAGNOSIS — Z34.83 SUPERVISION OF NORMAL INTRAUTERINE PREGNANCY IN MULTIGRAVIDA IN THIRD TRIMESTER: ICD-10-CM

## 2024-10-28 DIAGNOSIS — Z3A.30 30 WEEKS GESTATION OF PREGNANCY: ICD-10-CM

## 2024-10-28 PROBLEM — Z3A.20 20 WEEKS GESTATION OF PREGNANCY: Status: ACTIVE | Noted: 2024-06-24

## 2024-10-28 LAB
ABO GROUP BLD: NORMAL
BASOPHILS # BLD AUTO: 0.03 THOUSANDS/ÂΜL (ref 0–0.1)
BASOPHILS NFR BLD AUTO: 0 % (ref 0–1)
BLD GP AB SCN SERPL QL: NEGATIVE
EOSINOPHIL # BLD AUTO: 0.08 THOUSAND/ÂΜL (ref 0–0.61)
EOSINOPHIL NFR BLD AUTO: 1 % (ref 0–6)
ERYTHROCYTE [DISTWIDTH] IN BLOOD BY AUTOMATED COUNT: 12.8 % (ref 11.6–15.1)
GLUCOSE 1H P 50 G GLC PO SERPL-MCNC: 58 MG/DL (ref 70–134)
HCT VFR BLD AUTO: 34.9 % (ref 34.8–46.1)
HGB BLD-MCNC: 11.3 G/DL (ref 11.5–15.4)
IMM GRANULOCYTES # BLD AUTO: 0.05 THOUSAND/UL (ref 0–0.2)
IMM GRANULOCYTES NFR BLD AUTO: 1 % (ref 0–2)
LYMPHOCYTES # BLD AUTO: 1.58 THOUSANDS/ÂΜL (ref 0.6–4.47)
LYMPHOCYTES NFR BLD AUTO: 18 % (ref 14–44)
MCH RBC QN AUTO: 30.9 PG (ref 26.8–34.3)
MCHC RBC AUTO-ENTMCNC: 32.4 G/DL (ref 31.4–37.4)
MCV RBC AUTO: 95 FL (ref 82–98)
MONOCYTES # BLD AUTO: 0.55 THOUSAND/ÂΜL (ref 0.17–1.22)
MONOCYTES NFR BLD AUTO: 6 % (ref 4–12)
NEUTROPHILS # BLD AUTO: 6.75 THOUSANDS/ÂΜL (ref 1.85–7.62)
NEUTS SEG NFR BLD AUTO: 74 % (ref 43–75)
NRBC BLD AUTO-RTO: 0 /100 WBCS
PLATELET # BLD AUTO: 237 THOUSANDS/UL (ref 149–390)
PMV BLD AUTO: 10.1 FL (ref 8.9–12.7)
RBC # BLD AUTO: 3.66 MILLION/UL (ref 3.81–5.12)
RH BLD: POSITIVE
SPECIMEN EXPIRATION DATE: NORMAL
TREPONEMA PALLIDUM IGG+IGM AB [PRESENCE] IN SERUM OR PLASMA BY IMMUNOASSAY: NORMAL
WBC # BLD AUTO: 9.04 THOUSAND/UL (ref 4.31–10.16)

## 2024-10-28 PROCEDURE — 36415 COLL VENOUS BLD VENIPUNCTURE: CPT

## 2024-10-28 PROCEDURE — 86780 TREPONEMA PALLIDUM: CPT

## 2024-10-28 PROCEDURE — 99213 OFFICE O/P EST LOW 20 MIN: CPT | Performed by: OBSTETRICS & GYNECOLOGY

## 2024-10-28 PROCEDURE — 85025 COMPLETE CBC W/AUTO DIFF WBC: CPT

## 2024-10-28 PROCEDURE — 86900 BLOOD TYPING SEROLOGIC ABO: CPT

## 2024-10-28 PROCEDURE — 86850 RBC ANTIBODY SCREEN: CPT

## 2024-10-28 PROCEDURE — 82950 GLUCOSE TEST: CPT

## 2024-10-28 PROCEDURE — 86901 BLOOD TYPING SEROLOGIC RH(D): CPT

## 2024-10-28 NOTE — PROGRESS NOTES
"Assessment & Plan  23 y.o.  at 30w3d presenting for routine prenatal visit.     Problem List       Mild intermittent asthma without complication    Overview     Formatting of this note might be different from the original.   ICD10 clean up      Last Assessment & Plan:    Formatting of this note might be different from the original.   Uses albuterol 1-2 times a week.         Severe episode of recurrent major depressive disorder, without psychotic features (HCC)    Pregnancy complicated by tobacco use, third trimester    Overview     1 pack lasts 3-4 days. Discussed cessation.  Vapes nicotine daily  Nicotine patch ordered  F/u repeat growth US         30 weeks gestation of pregnancy    Overview     28 week labs pending  Declines Tdap, Flu vaccines, would like RSV vaccine [ ]  [x]Delivery consent  Contraception- NEXPLANON             Insufficient prenatal care in third trimester    Overview     No routine prenatal care 12-30 weeks other than ultrasound          Other Visit Diagnoses       Encounter for supervision of high risk pregnancy in third trimester, antepartum    -  Primary          ____________________________________________________________  Subjective  She is without complaint.   She denies contractions, loss of fluid, or vaginal bleeding.   She feels regular fetal movements.     Objective  /74 (BP Location: Left arm, Patient Position: Sitting, Cuff Size: Standard)   Pulse 87   Ht 5' 3\" (1.6 m)   Wt 73.8 kg (162 lb 12.8 oz)   LMP 2024 (Exact Date)   SpO2 100%   BMI 28.84 kg/m²   FHR: 150 bpm  Fundal height 30 cm    Physical Exam  Constitutional:       Appearance: She is well-developed.   Cardiovascular:      Rate and Rhythm: Normal rate.   Pulmonary:      Effort: Pulmonary effort is normal. No respiratory distress.   Abdominal:      Palpations: Abdomen is soft.      Tenderness: There is no abdominal tenderness.   Skin:     General: Skin is warm and dry.          Patient's Active " Problem List  Patient Active Problem List   Diagnosis    Mild intermittent asthma without complication    Severe episode of recurrent major depressive disorder, without psychotic features (HCC)    Pregnancy complicated by tobacco use, third trimester    30 weeks gestation of pregnancy    Insufficient prenatal care in third trimester           Rhonda Loomis MD  10/28/2024  4:20 PM

## 2024-10-28 NOTE — LETTER
October 28, 2024     Patient: Bijal Davis  YOB: 2000  Date of Visit: 10/28/2024      To Whom it May Concern:    Bijal Davis is under my professional care. Bijal was seen in my office on 10/28/2024. Bijal may participate in ultrasounds through [facility].    If you have any questions or concerns, please don't hesitate to call.         Sincerely,          Rhonda Loomis MD        CC: No Recipients

## 2024-10-30 ENCOUNTER — TELEPHONE (OUTPATIENT)
Dept: OBGYN CLINIC | Facility: CLINIC | Age: 24
End: 2024-10-30

## 2024-10-31 ENCOUNTER — ULTRASOUND (OUTPATIENT)
Age: 24
End: 2024-10-31
Payer: COMMERCIAL

## 2024-10-31 VITALS
BODY MASS INDEX: 29.06 KG/M2 | HEIGHT: 63 IN | DIASTOLIC BLOOD PRESSURE: 62 MMHG | SYSTOLIC BLOOD PRESSURE: 126 MMHG | HEART RATE: 92 BPM | WEIGHT: 164 LBS

## 2024-10-31 DIAGNOSIS — Z36.89 ENCOUNTER FOR ULTRASOUND TO ASSESS FETAL GROWTH: ICD-10-CM

## 2024-10-31 DIAGNOSIS — O09.33 LIMITED PRENATAL CARE IN THIRD TRIMESTER: ICD-10-CM

## 2024-10-31 DIAGNOSIS — Z3A.30 30 WEEKS GESTATION OF PREGNANCY: ICD-10-CM

## 2024-10-31 DIAGNOSIS — O99.333: Primary | ICD-10-CM

## 2024-10-31 DIAGNOSIS — Z36.2 ENCOUNTER FOR FOLLOW-UP ULTRASOUND OF FETAL ANATOMY: ICD-10-CM

## 2024-10-31 PROCEDURE — 76816 OB US FOLLOW-UP PER FETUS: CPT | Performed by: OBSTETRICS & GYNECOLOGY

## 2024-10-31 PROCEDURE — 99213 OFFICE O/P EST LOW 20 MIN: CPT | Performed by: OBSTETRICS & GYNECOLOGY

## 2024-10-31 NOTE — LETTER
October 31, 2024     Rhonda Loomis MD  [address] [facility] [address]  [facility] [address]    Patient: Bijal Davis   YOB: 2000   Date of Visit: 10/31/2024       Dear Dr. Loomis:    Thank you for referring Bijal Davis to me for evaluation. Below are my notes for this consultation.    If you have questions, please do not hesitate to call me. I look forward to following your patient along with you.         Sincerely,        Ute Hernandez MD        CC: No Recipients    Ute Hernandez MD  10/31/2024  2:49 PM  Sign when Signing Visit  Bijal Davis has no complaints today.  She reports regular fetal movements and does not report any problems.  She is here today at 30w6d for an ultrasound for fetal growth and missed anatomy.  She had normal NIPT normal diabetes screen.    Problem list:  Insufficient prenatal care in the third trimester.    Ultrasound findings:  The ultrasound today shows normal interval fetal growth and fluid.  The biometrics show shorter limb bones as compared to the measurements of the head circumference and abdominal circumference.  The bones have normal echogenicity and have an otherwise normal appearance without bowing or fractures.  There is no evidence for a narrow chest.  The cranial shape appears normal.    Pregnancy ultrasound has limitations and is unable to detect all forms of fetal congenital abnormalities.  The inaccuracy in the EFW can be off by 1 lb either way in the third trimester.    Specific counseling was provided on the following problems:  1.  We discussed the findings today of the 2 to 3-week lag in the growth of the limb bones.  Bijal and her partner, she reports, are both short in stature.  Bijal at 5 foot 3 is the tallest one in her family.  Based on the ultrasound showing otherwise normal-appearing limb bones without any other evidence for signs for dwarfism the short limbs noted today are likely secondary to family  genetics.    Follow up recommended:   No further follow-up ultrasounds are recommended at this time.    Pre visit time reviewing her records   10 minutes  Face to face time 5 minutes  Post visit time on documentation of note, updating her problem list, adding orders and prescriptions 10 minutes.  Procedures that were completed today were charged separately.   The level of decision making was low level complexity.    Ute Hernandez MD

## 2024-10-31 NOTE — PROGRESS NOTES
Bijal Davis has no complaints today.  She reports regular fetal movements and does not report any problems.  She is here today at 30w6d for an ultrasound for fetal growth and missed anatomy.  She had normal NIPT normal diabetes screen.    Problem list:  Insufficient prenatal care in the third trimester.    Ultrasound findings:  The ultrasound today shows normal interval fetal growth and fluid.  The biometrics show shorter limb bones as compared to the measurements of the head circumference and abdominal circumference.  The bones have normal echogenicity and have an otherwise normal appearance without bowing or fractures.  There is no evidence for a narrow chest.  The cranial shape appears normal.    Pregnancy ultrasound has limitations and is unable to detect all forms of fetal congenital abnormalities.  The inaccuracy in the EFW can be off by 1 lb either way in the third trimester.    Specific counseling was provided on the following problems:  1.  We discussed the findings today of the 2 to 3-week lag in the growth of the limb bones.  Bijal and her partner, she reports, are both short in stature.  Bijal at 5 foot 3 is the tallest one in her family.  Based on the ultrasound showing otherwise normal-appearing limb bones without any other evidence for signs for dwarfism the short limbs noted today are likely secondary to family genetics.    Follow up recommended:   No further follow-up ultrasounds are recommended at this time.    Pre visit time reviewing her records   10 minutes  Face to face time 5 minutes  Post visit time on documentation of note, updating her problem list, adding orders and prescriptions 10 minutes.  Procedures that were completed today were charged separately.   The level of decision making was low level complexity.    Ute Hernandez MD

## 2024-11-19 ENCOUNTER — ROUTINE PRENATAL (OUTPATIENT)
Dept: OBGYN CLINIC | Facility: CLINIC | Age: 24
End: 2024-11-19
Payer: COMMERCIAL

## 2024-11-19 VITALS
DIASTOLIC BLOOD PRESSURE: 68 MMHG | HEIGHT: 63 IN | BODY MASS INDEX: 28.17 KG/M2 | WEIGHT: 159 LBS | HEART RATE: 100 BPM | SYSTOLIC BLOOD PRESSURE: 114 MMHG

## 2024-11-19 DIAGNOSIS — O09.33 INSUFFICIENT PRENATAL CARE IN THIRD TRIMESTER: ICD-10-CM

## 2024-11-19 DIAGNOSIS — Z3A.30 30 WEEKS GESTATION OF PREGNANCY: ICD-10-CM

## 2024-11-19 DIAGNOSIS — O99.333: Primary | ICD-10-CM

## 2024-11-19 PROCEDURE — 99213 OFFICE O/P EST LOW 20 MIN: CPT | Performed by: NURSE PRACTITIONER

## 2024-11-19 NOTE — PROGRESS NOTES
22 yo  at 33w4d gestation.  Depression (not taking Wellbutrin and lexapro as it was making her sick); tobacco use/vaping (2 cigs a day); insufficient PN care    28w labs normal  10/31/24 MFM, 31w US: efw 20%; anatomy complete.  Declines TDAP.  Consent signed.    Baby is active, denies leakage of fluid, vaginal bleeding or uterine contractions.  + yuly carlos a  S=D, normal FHTs, normal BP    Reports that she did not fill in FOB information on her paperwork as she is not absolutely sure of paternity.  Would like to do paternity testing after birth prior to completing it.    RV 2w-- GBS

## 2024-12-03 ENCOUNTER — ROUTINE PRENATAL (OUTPATIENT)
Dept: OBGYN CLINIC | Facility: CLINIC | Age: 24
End: 2024-12-03
Payer: COMMERCIAL

## 2024-12-03 VITALS
HEART RATE: 86 BPM | BODY MASS INDEX: 28.6 KG/M2 | WEIGHT: 161.4 LBS | SYSTOLIC BLOOD PRESSURE: 104 MMHG | HEIGHT: 63 IN | OXYGEN SATURATION: 99 % | DIASTOLIC BLOOD PRESSURE: 62 MMHG

## 2024-12-03 DIAGNOSIS — Z23 NEED FOR TDAP VACCINATION: ICD-10-CM

## 2024-12-03 DIAGNOSIS — F32.A DEPRESSION AFFECTING PREGNANCY IN THIRD TRIMESTER, ANTEPARTUM: ICD-10-CM

## 2024-12-03 DIAGNOSIS — O09.33 INSUFFICIENT PRENATAL CARE IN THIRD TRIMESTER: ICD-10-CM

## 2024-12-03 DIAGNOSIS — Z23 ENCOUNTER FOR IMMUNIZATION: ICD-10-CM

## 2024-12-03 DIAGNOSIS — Z3A.35 35 WEEKS GESTATION OF PREGNANCY: ICD-10-CM

## 2024-12-03 DIAGNOSIS — O99.343 DEPRESSION AFFECTING PREGNANCY IN THIRD TRIMESTER, ANTEPARTUM: ICD-10-CM

## 2024-12-03 DIAGNOSIS — O99.333: Primary | ICD-10-CM

## 2024-12-03 PROCEDURE — 87150 DNA/RNA AMPLIFIED PROBE: CPT | Performed by: OBSTETRICS & GYNECOLOGY

## 2024-12-03 PROCEDURE — 90471 IMMUNIZATION ADMIN: CPT | Performed by: OBSTETRICS & GYNECOLOGY

## 2024-12-03 PROCEDURE — 90715 TDAP VACCINE 7 YRS/> IM: CPT | Performed by: OBSTETRICS & GYNECOLOGY

## 2024-12-03 PROCEDURE — 99213 OFFICE O/P EST LOW 20 MIN: CPT | Performed by: OBSTETRICS & GYNECOLOGY

## 2024-12-03 NOTE — PROGRESS NOTES
Pt is a 23 y.o.  35w4d  Pregnancy is complicated by depression (no meds), tobacco use (down to 2 cig/day, occ vaping), insufficient prenatal care    Pt reports +FM. Denies vb, lof. Notes irregular BH ctx. PTL precautions and fkc reviewed    GBS collected    Reviewed value of influenza, TDAP and RSV vaccinations. Pt declines influenza and RSV. Accepts TDAP today  F/U 1 week.

## 2024-12-05 LAB — GP B STREP DNA SPEC QL NAA+PROBE: NEGATIVE

## 2024-12-10 ENCOUNTER — ROUTINE PRENATAL (OUTPATIENT)
Dept: OBGYN CLINIC | Facility: CLINIC | Age: 24
End: 2024-12-10
Payer: COMMERCIAL

## 2024-12-10 VITALS
DIASTOLIC BLOOD PRESSURE: 60 MMHG | SYSTOLIC BLOOD PRESSURE: 110 MMHG | BODY MASS INDEX: 28.67 KG/M2 | WEIGHT: 161.8 LBS | OXYGEN SATURATION: 99 % | HEART RATE: 90 BPM | HEIGHT: 63 IN

## 2024-12-10 DIAGNOSIS — O09.33 INSUFFICIENT PRENATAL CARE IN THIRD TRIMESTER: ICD-10-CM

## 2024-12-10 DIAGNOSIS — F32.A DEPRESSION AFFECTING PREGNANCY IN THIRD TRIMESTER, ANTEPARTUM: Primary | ICD-10-CM

## 2024-12-10 DIAGNOSIS — O99.333: ICD-10-CM

## 2024-12-10 DIAGNOSIS — Z3A.36 36 WEEKS GESTATION OF PREGNANCY: ICD-10-CM

## 2024-12-10 DIAGNOSIS — O99.343 DEPRESSION AFFECTING PREGNANCY IN THIRD TRIMESTER, ANTEPARTUM: Primary | ICD-10-CM

## 2024-12-10 PROCEDURE — 99213 OFFICE O/P EST LOW 20 MIN: CPT | Performed by: OBSTETRICS & GYNECOLOGY

## 2024-12-10 NOTE — PROGRESS NOTES
Pt is a 23 y.o.  35w4d  Pregnancy is complicated by depression (no meds), tobacco use (down to 2 cig/day, occ vaping), insufficient prenatal care     Pt reports +FM. Denies vb, lof. Notes irregular BH ctx. PTL precautions and fkc reviewed     GBS negative and reviewed with patient.      Pt interested in elective IOL. Will be 39 weeks on  when Dr. Loomis is on call. IOL consent reviewed and signed  Will schedule on  for  if no spontaneous labor by then  F/U 1 week.

## 2024-12-14 ENCOUNTER — HOSPITAL ENCOUNTER (EMERGENCY)
Facility: HOSPITAL | Age: 24
Discharge: HOME | End: 2024-12-14
Attending: EMERGENCY MEDICINE | Admitting: EMERGENCY MEDICINE
Payer: COMMERCIAL

## 2024-12-14 VITALS
DIASTOLIC BLOOD PRESSURE: 71 MMHG | WEIGHT: 161.38 LBS | BODY MASS INDEX: 28.59 KG/M2 | SYSTOLIC BLOOD PRESSURE: 130 MMHG | OXYGEN SATURATION: 100 % | HEART RATE: 80 BPM | TEMPERATURE: 98.9 F | RESPIRATION RATE: 18 BRPM

## 2024-12-14 DIAGNOSIS — K08.89 DENTALGIA: Primary | ICD-10-CM

## 2024-12-14 PROCEDURE — 64400 NJX AA&/STRD TRIGEMINAL NRV: CPT | Performed by: EMERGENCY MEDICINE

## 2024-12-14 PROCEDURE — 99282 EMERGENCY DEPT VISIT SF MDM: CPT

## 2024-12-14 PROCEDURE — 99284 EMERGENCY DEPT VISIT MOD MDM: CPT | Performed by: EMERGENCY MEDICINE

## 2024-12-14 RX ORDER — PENICILLIN V POTASSIUM 500 MG/1
500 TABLET, FILM COATED ORAL 4 TIMES DAILY
Qty: 40 TABLET | Refills: 0 | Status: SHIPPED | OUTPATIENT
Start: 2024-12-14 | End: 2024-12-21

## 2024-12-14 RX ORDER — LIDOCAINE HYDROCHLORIDE 10 MG/ML
10 INJECTION, SOLUTION EPIDURAL; INFILTRATION; INTRACAUDAL; PERINEURAL ONCE
Status: COMPLETED | OUTPATIENT
Start: 2024-12-14 | End: 2024-12-14

## 2024-12-14 RX ADMIN — LIDOCAINE HYDROCHLORIDE 10 ML: 10 SOLUTION INTRAVENOUS at 17:08

## 2024-12-14 NOTE — ED PROVIDER NOTES
Time reflects when diagnosis was documented in both MDM as applicable and the Disposition within this note       Time User Action Codes Description Comment    12/14/2024  5:20 PM Lenny العراقي Add [K08.89] Dentalgia           ED Disposition       ED Disposition   Discharge    Condition   Stable    Date/Time   Sat Dec 14, 2024  5:20 PM    Comment   Bijal Davis discharge to home/self care.                   Assessment & Plan       Medical Decision Making  23-year-old female presents with right upper molar dentalgia  Patient given dental block with complete resolution of symptoms  Patient to be given antibiotics for presumed possible dental infection  Patient to be discharged with dental clinic follow-up    Problems Addressed:  Dentalgia: acute illness or injury    Risk  Prescription drug management.             Medications   lidocaine (PF) (XYLOCAINE-MPF) 1 % injection 10 mL (10 mL Infiltration Given by Other 12/14/24 1995)       ED Risk Strat Scores                          SBIRT 20yo+      Flowsheet Row Most Recent Value   Initial Alcohol Screen: US AUDIT-C     1. How often do you have a drink containing alcohol? 0 Filed at: 12/14/2024 1655   2. How many drinks containing alcohol do you have on a typical day you are drinking?  0 Filed at: 12/14/2024 1655   3a. Male UNDER 65: How often do you have five or more drinks on one occasion? 0 Filed at: 12/14/2024 1655   3b. FEMALE Any Age, or MALE 65+: How often do you have 4 or more drinks on one occassion? 0 Filed at: 12/14/2024 1655   Audit-C Score 0 Filed at: 12/14/2024 1655   SARA: How many times in the past year have you...    Used an illegal drug or used a prescription medication for non-medical reasons? Never Filed at: 12/14/2024 1655                            History of Present Illness       Chief Complaint   Patient presents with    Dental Pain     Pt reports right molar pain increasing over the past week. PTA 2000 mg tylenol.        Past Medical History:  "  Diagnosis Date    Anxiety     Asthma     Bipolar 1 disorder (HCC)     Depression     Varicella       Past Surgical History:   Procedure Laterality Date    NO PAST SURGERIES        Family History   Problem Relation Age of Onset    Cirrhosis Mother     No Known Problems Sister     Asthma Brother     Drug abuse Brother     No Known Problems Daughter     Diabetes Maternal Grandmother     Heart disease Maternal Grandmother     Kidney disease Maternal Grandmother     Cancer Maternal Grandfather       Social History     Tobacco Use    Smoking status: Never    Smokeless tobacco: Never   Vaping Use    Vaping status: Every Day    Substances: Nicotine, Flavoring   Substance Use Topics    Alcohol use: Not Currently    Drug use: No      E-Cigarette/Vaping    E-Cigarette Use Current Every Day User     Comments \"uses all day long\"       E-Cigarette/Vaping Substances    Nicotine Yes     THC No     CBD No     Flavoring Yes     Other No     Unknown No       I have reviewed and agree with the history as documented.     HPI  23-year-old female presents with right upper dentalgia.  Patient states that she has been having right upper dentalgia for the past couple days.  Was seen by the dentist however states that due to her current pregnancy status she had to get approval by the OB doctor prior to getting a dental assessment.  Patient reports no fever, chills, nausea, vomiting, diarrhea, right jaw swelling.  Denies any difficulty breathing or difficulty swallowing.    Review of Systems   Constitutional:  Negative for chills, diaphoresis, fever and unexpected weight change.   HENT:  Positive for dental problem. Negative for ear pain and sore throat.    Eyes:  Negative for visual disturbance.   Respiratory:  Negative for cough, chest tightness and shortness of breath.    Cardiovascular:  Negative for chest pain and leg swelling.   Gastrointestinal:  Negative for abdominal distention, abdominal pain, constipation, diarrhea, nausea and " vomiting.   Endocrine: Negative.    Genitourinary:  Negative for difficulty urinating and dysuria.   Musculoskeletal: Negative.    Skin: Negative.    Allergic/Immunologic: Negative.    Neurological: Negative.    Hematological: Negative.    Psychiatric/Behavioral: Negative.     All other systems reviewed and are negative.          Objective       ED Triage Vitals [12/14/24 1651]   Temperature Pulse Blood Pressure Respirations SpO2 Patient Position - Orthostatic VS   98.9 °F (37.2 °C) 80 130/71 18 100 % Sitting      Temp Source Heart Rate Source BP Location FiO2 (%) Pain Score    Oral Monitor Left arm -- --      Vitals      Date and Time Temp Pulse SpO2 Resp BP Pain Score FACES Pain Rating User   12/14/24 1651 98.9 °F (37.2 °C) 80 100 % 18 130/71 -- -- FK            Physical Exam  Vitals and nursing note reviewed.   Constitutional:       General: She is not in acute distress.     Appearance: Normal appearance. She is not ill-appearing.   HENT:      Head: Normocephalic and atraumatic.      Right Ear: External ear normal.      Left Ear: External ear normal.      Nose: Nose normal.      Mouth/Throat:      Mouth: Mucous membranes are moist.      Pharynx: Oropharynx is clear.     Eyes:      General: No scleral icterus.        Right eye: No discharge.         Left eye: No discharge.      Extraocular Movements: Extraocular movements intact.      Conjunctiva/sclera: Conjunctivae normal.      Pupils: Pupils are equal, round, and reactive to light.   Cardiovascular:      Rate and Rhythm: Normal rate and regular rhythm.      Pulses: Normal pulses.      Heart sounds: Normal heart sounds.   Pulmonary:      Effort: Pulmonary effort is normal.      Breath sounds: Normal breath sounds.   Abdominal:      General: Abdomen is flat. Bowel sounds are normal. There is no distension.      Palpations: Abdomen is soft.      Tenderness: There is no abdominal tenderness. There is no guarding or rebound.   Musculoskeletal:         General:  Normal range of motion.      Cervical back: Normal range of motion and neck supple.   Skin:     General: Skin is warm and dry.      Capillary Refill: Capillary refill takes less than 2 seconds.   Neurological:      General: No focal deficit present.      Mental Status: She is alert and oriented to person, place, and time. Mental status is at baseline.   Psychiatric:         Mood and Affect: Mood normal.         Behavior: Behavior normal.         Thought Content: Thought content normal.         Judgment: Judgment normal.         Results Reviewed       None            No orders to display       Nerve block    Date/Time: 12/14/2024 5:16 PM    Performed by: Lenny العراقي MD  Authorized by: Lenny العراقي MD    Patient location:  ED  [place] Protocol:  Consent: Verbal consent obtained.  Risks and benefits: risks, benefits and alternatives were discussed  Consent given by: patient  Patient understanding: patient states understanding of the procedure being performed  Patient consent: the patient's understanding of the procedure matches consent given    Indications:     Indications:  Pain relief  Location:     Body area:  Head    Laterality:  Right (Periapical block of the right upper molar)  Procedure details (see MAR for exact dosages):     Block needle gauge:  25 G    Anesthetic injected:  Lidocaine 1% w/o epi  Post-procedure details:     Outcome:  Pain relieved    Patient tolerance of procedure:  Tolerated well, no immediate complications      ED Medication and Procedure Management   Prior to Admission Medications   Prescriptions Last Dose Informant Patient Reported? Taking?   Prenatal Vit-Fe Fumarate-FA (Prenatal Complete) 14-0.4 MG TABS   No No   Sig: Take 1 tablet by mouth daily   buPROPion (WELLBUTRIN XL) 300 mg 24 hr tablet   Yes No   Sig: Take 300 mg by mouth every morning   Patient not taking: Reported on 12/10/2024   escitalopram (LEXAPRO) 10 mg tablet   Yes No   Sig: Take 10 mg by mouth daily   Patient not taking:  Reported on 12/10/2024      Facility-Administered Medications: None     Discharge Medication List as of 12/14/2024  5:20 PM        START taking these medications    Details   penicillin V potassium (VEETID) 500 mg tablet Take 1 tablet (500 mg total) by mouth 4 (four) times a day for 7 days, Starting Sat 12/14/2024, Until Sat 12/21/2024, Normal           CONTINUE these medications which have NOT CHANGED    Details   buPROPion (WELLBUTRIN XL) 300 mg 24 hr tablet Take 300 mg by mouth every morning, Starting Tue 7/30/2024, Historical Med      escitalopram (LEXAPRO) 10 mg tablet Take 10 mg by mouth daily, Historical Med      Prenatal Vit-Fe Fumarate-FA (Prenatal Complete) 14-0.4 MG TABS Take 1 tablet by mouth daily, Starting Sun 4/28/2024, Normal           No discharge procedures on file.  ED SEPSIS DOCUMENTATION   Time reflects when diagnosis was documented in both MDM as applicable and the Disposition within this note       Time User Action Codes Description Comment    12/14/2024  5:20 PM Lenny العراقي Add [K08.89] Dentalgia                  Lenny العراقي MD  12/14/24 2121

## 2024-12-17 ENCOUNTER — TELEPHONE (OUTPATIENT)
Dept: OBGYN CLINIC | Facility: CLINIC | Age: 24
End: 2024-12-17

## 2024-12-17 NOTE — TELEPHONE ENCOUNTER
Called and left vm for pt to call back and reschedule canceled appt from today 12/17/24. Pt should be seen this week for her 37 week ob appt.

## 2024-12-23 ENCOUNTER — ROUTINE PRENATAL (OUTPATIENT)
Dept: OBGYN CLINIC | Facility: CLINIC | Age: 24
End: 2024-12-23
Payer: COMMERCIAL

## 2024-12-23 VITALS
HEIGHT: 63 IN | BODY MASS INDEX: 28.53 KG/M2 | SYSTOLIC BLOOD PRESSURE: 130 MMHG | WEIGHT: 161 LBS | HEART RATE: 76 BPM | DIASTOLIC BLOOD PRESSURE: 62 MMHG

## 2024-12-23 DIAGNOSIS — O09.33 INSUFFICIENT PRENATAL CARE IN THIRD TRIMESTER: ICD-10-CM

## 2024-12-23 DIAGNOSIS — Z3A.38 38 WEEKS GESTATION OF PREGNANCY: ICD-10-CM

## 2024-12-23 DIAGNOSIS — O99.333: ICD-10-CM

## 2024-12-23 DIAGNOSIS — O99.343 DEPRESSION AFFECTING PREGNANCY IN THIRD TRIMESTER, ANTEPARTUM: ICD-10-CM

## 2024-12-23 DIAGNOSIS — O26.893 VAGINAL DISCHARGE DURING PREGNANCY IN THIRD TRIMESTER: ICD-10-CM

## 2024-12-23 DIAGNOSIS — N89.8 VAGINAL DISCHARGE DURING PREGNANCY IN THIRD TRIMESTER: ICD-10-CM

## 2024-12-23 DIAGNOSIS — O09.93 ENCOUNTER FOR SUPERVISION OF HIGH RISK PREGNANCY IN THIRD TRIMESTER, ANTEPARTUM: Primary | ICD-10-CM

## 2024-12-23 DIAGNOSIS — F32.A DEPRESSION AFFECTING PREGNANCY IN THIRD TRIMESTER, ANTEPARTUM: ICD-10-CM

## 2024-12-23 LAB
SL AMB  POCT GLUCOSE, UA: ABNORMAL
SL AMB LEUKOCYTE ESTERASE,UA: ABNORMAL
SL AMB POCT BILIRUBIN,UA: ABNORMAL
SL AMB POCT BLOOD,UA: ABNORMAL
SL AMB POCT CLARITY,UA: CLEAR
SL AMB POCT COLOR,UA: ABNORMAL
SL AMB POCT KETONES,UA: ABNORMAL
SL AMB POCT NITRITE,UA: ABNORMAL
SL AMB POCT PH,UA: 6
SL AMB POCT SPECIFIC GRAVITY,UA: 1.02
SL AMB POCT URINE PROTEIN: ABNORMAL
SL AMB POCT UROBILINOGEN: 0.2

## 2024-12-23 PROCEDURE — 87480 CANDIDA DNA DIR PROBE: CPT | Performed by: OBSTETRICS & GYNECOLOGY

## 2024-12-23 PROCEDURE — 87660 TRICHOMONAS VAGIN DIR PROBE: CPT | Performed by: OBSTETRICS & GYNECOLOGY

## 2024-12-23 PROCEDURE — 99213 OFFICE O/P EST LOW 20 MIN: CPT | Performed by: OBSTETRICS & GYNECOLOGY

## 2024-12-23 PROCEDURE — 87510 GARDNER VAG DNA DIR PROBE: CPT | Performed by: OBSTETRICS & GYNECOLOGY

## 2024-12-23 PROCEDURE — 81002 URINALYSIS NONAUTO W/O SCOPE: CPT | Performed by: OBSTETRICS & GYNECOLOGY

## 2024-12-23 NOTE — PROGRESS NOTES
"Assessment & Plan  24 y.o.  at 38w3d presenting for routine prenatal visit.     Problem List       Mild intermittent asthma without complication    Overview   Formatting of this note might be different from the original.   ICD10 clean up      Last Assessment & Plan:    Formatting of this note might be different from the original.   Uses albuterol 1-2 times a week.         Severe episode of recurrent major depressive disorder, without psychotic features (HCC)    Pregnancy complicated by tobacco use, third trimester    Overview   1 pack lasts 3-4 days. Discussed cessation.  Vapes nicotine daily- no longer vaping; down to 2 cigarettes a day.  Nicotine patch ordered  F/u repeat growth US- 31s US 20th percentile         38 weeks gestation of pregnancy    Overview   28 week labs normal  [X]Tdap  [place] Flu vaccines, RSV vaccine   [x]Delivery consent  Contraception- NEXPLANON  Desires elective IOL at 39 weeks ( with Dr. Loomis)  IOL consent signed    Reports that she did not fill in FOB information on birth cert paperwork as she is not absolutely sure of paternity.  Would like to do paternity testing after birth prior to completing it.           Insufficient prenatal care in third trimester    Overview   No routine prenatal care 12-30 weeks other than ultrasound         Depression affecting pregnancy in third trimester, antepartum     Other Visit Diagnoses         Encounter for supervision of high risk pregnancy in third trimester, antepartum    -  Primary          ____________________________________________________________  Subjective  She reports increased vaginal discharge and itching.  She denies contractions, loss of fluid, or vaginal bleeding.   She feels regular fetal movements.     Objective  /62 (BP Location: Right arm, Cuff Size: Standard)   Pulse 76   Ht 5' 3\" (1.6 m)   Wt 73 kg (161 lb)   LMP 2024 (Exact Date)   BMI 28.52 kg/m²   FHR: 145 bpm  Fundal height 36 cm  Declines " SVE    Physical Exam  Constitutional:       Appearance: She is well-developed.   Genitourinary:      Vulva normal.   Cardiovascular:      Rate and Rhythm: Normal rate.   Pulmonary:      Effort: Pulmonary effort is normal. No respiratory distress.   Abdominal:      Palpations: Abdomen is soft.      Tenderness: There is no abdominal tenderness.   Skin:     General: Skin is warm and dry.          Patient's Active Problem List  Patient Active Problem List   Diagnosis    Mild intermittent asthma without complication    Severe episode of recurrent major depressive disorder, without psychotic features (HCC)    Pregnancy complicated by tobacco use, third trimester    38 weeks gestation of pregnancy    Insufficient prenatal care in third trimester    Depression affecting pregnancy in third trimester, antepartum           Rhonda Loomis MD  12/23/2024  2:52 PM

## 2024-12-24 ENCOUNTER — RESULTS FOLLOW-UP (OUTPATIENT)
Dept: OBGYN CLINIC | Facility: CLINIC | Age: 24
End: 2024-12-24

## 2024-12-24 LAB
CANDIDA RRNA VAG QL PROBE: NOT DETECTED
G VAGINALIS RRNA GENITAL QL PROBE: NOT DETECTED
T VAGINALIS RRNA GENITAL QL PROBE: NOT DETECTED

## 2024-12-27 ENCOUNTER — ANESTHESIA (INPATIENT)
Dept: LABOR AND DELIVERY | Facility: HOSPITAL | Age: 24
DRG: 540 | End: 2024-12-27
Payer: COMMERCIAL

## 2024-12-27 ENCOUNTER — HOSPITAL ENCOUNTER (INPATIENT)
Facility: HOSPITAL | Age: 24
LOS: 3 days | Discharge: HOME | DRG: 540 | End: 2024-12-30
Attending: OBSTETRICS & GYNECOLOGY | Admitting: OBSTETRICS & GYNECOLOGY
Payer: COMMERCIAL

## 2024-12-27 ENCOUNTER — HOSPITAL ENCOUNTER (OUTPATIENT)
Dept: LABOR AND DELIVERY | Facility: HOSPITAL | Age: 24
Discharge: HOME | DRG: 540 | End: 2024-12-27
Payer: COMMERCIAL

## 2024-12-27 DIAGNOSIS — Z98.891 S/P CESAREAN SECTION: ICD-10-CM

## 2024-12-27 DIAGNOSIS — Z3A.39 39 WEEKS GESTATION OF PREGNANCY: Primary | ICD-10-CM

## 2024-12-27 DIAGNOSIS — O09.33 INSUFFICIENT PRENATAL CARE IN THIRD TRIMESTER: ICD-10-CM

## 2024-12-27 PROBLEM — Z72.0 NICOTINE USE: Status: ACTIVE | Noted: 2024-12-27

## 2024-12-27 PROBLEM — O09.30 INSUFFICIENT PRENATAL CARE: Status: ACTIVE | Noted: 2024-12-27

## 2024-12-27 PROBLEM — Z34.90 ENCOUNTER FOR INDUCTION OF LABOR: Status: ACTIVE | Noted: 2024-12-27

## 2024-12-27 LAB
ABO GROUP BLD: NORMAL
ALBUMIN SERPL BCG-MCNC: 3.9 G/DL (ref 3.5–5)
ALP SERPL-CCNC: 160 U/L (ref 34–104)
ALT SERPL W P-5'-P-CCNC: 9 U/L (ref 7–52)
ANION GAP SERPL CALCULATED.3IONS-SCNC: 10 MMOL/L (ref 4–13)
AST SERPL W P-5'-P-CCNC: 14 U/L (ref 13–39)
BILIRUB SERPL-MCNC: 0.47 MG/DL (ref 0.2–1)
BLD GP AB SCN SERPL QL: NEGATIVE
BUN SERPL-MCNC: 6 MG/DL (ref 5–25)
CALCIUM SERPL-MCNC: 9.2 MG/DL (ref 8.4–10.2)
CHLORIDE SERPL-SCNC: 105 MMOL/L (ref 96–108)
CO2 SERPL-SCNC: 21 MMOL/L (ref 21–32)
CREAT SERPL-MCNC: 0.63 MG/DL (ref 0.6–1.3)
ERYTHROCYTE [DISTWIDTH] IN BLOOD BY AUTOMATED COUNT: 13.1 % (ref 11.6–15.1)
GFR SERPL CREATININE-BSD FRML MDRD: 125 ML/MIN/1.73SQ M
GLUCOSE SERPL-MCNC: 78 MG/DL (ref 65–140)
HCT VFR BLD AUTO: 37.2 % (ref 34.8–46.1)
HGB BLD-MCNC: 12.5 G/DL (ref 11.5–15.4)
HOLD SPECIMEN: YES
MCH RBC QN AUTO: 28.9 PG (ref 26.8–34.3)
MCHC RBC AUTO-ENTMCNC: 33.6 G/DL (ref 31.4–37.4)
MCV RBC AUTO: 86 FL (ref 82–98)
PLATELET # BLD AUTO: 238 THOUSANDS/UL (ref 149–390)
PMV BLD AUTO: 10.1 FL (ref 8.9–12.7)
POTASSIUM SERPL-SCNC: 3.7 MMOL/L (ref 3.5–5.3)
PROT SERPL-MCNC: 7.2 G/DL (ref 6.4–8.4)
RBC # BLD AUTO: 4.32 MILLION/UL (ref 3.81–5.12)
RH BLD: POSITIVE
SODIUM SERPL-SCNC: 136 MMOL/L (ref 135–147)
SPECIMEN EXPIRATION DATE: NORMAL
TREPONEMA PALLIDUM IGG+IGM AB [PRESENCE] IN SERUM OR PLASMA BY IMMUNOASSAY: NORMAL
WBC # BLD AUTO: 10.88 THOUSAND/UL (ref 4.31–10.16)

## 2024-12-27 PROCEDURE — 86901 BLOOD TYPING SEROLOGIC RH(D): CPT

## 2024-12-27 PROCEDURE — 4A1HXCZ MONITORING OF PRODUCTS OF CONCEPTION, CARDIAC RATE, EXTERNAL APPROACH: ICD-10-PCS | Performed by: OBSTETRICS & GYNECOLOGY

## 2024-12-27 PROCEDURE — 10H07YZ INSERTION OF OTHER DEVICE INTO PRODUCTS OF CONCEPTION, VIA NATURAL OR ARTIFICIAL OPENING: ICD-10-PCS | Performed by: OBSTETRICS & GYNECOLOGY

## 2024-12-27 PROCEDURE — 80053 COMPREHEN METABOLIC PANEL: CPT

## 2024-12-27 PROCEDURE — NC001 PR NO CHARGE: Performed by: OBSTETRICS & GYNECOLOGY

## 2024-12-27 PROCEDURE — 86900 BLOOD TYPING SEROLOGIC ABO: CPT

## 2024-12-27 PROCEDURE — 85027 COMPLETE CBC AUTOMATED: CPT

## 2024-12-27 PROCEDURE — 10H073Z INSERTION OF MONITORING ELECTRODE INTO PRODUCTS OF CONCEPTION, VIA NATURAL OR ARTIFICIAL OPENING: ICD-10-PCS | Performed by: OBSTETRICS & GYNECOLOGY

## 2024-12-27 PROCEDURE — 86850 RBC ANTIBODY SCREEN: CPT

## 2024-12-27 PROCEDURE — 86780 TREPONEMA PALLIDUM: CPT

## 2024-12-27 RX ORDER — TERBUTALINE SULFATE 1 MG/ML
INJECTION, SOLUTION SUBCUTANEOUS
Status: COMPLETED
Start: 2024-12-27 | End: 2024-12-27

## 2024-12-27 RX ORDER — ROPIVACAINE HYDROCHLORIDE 2 MG/ML
INJECTION, SOLUTION EPIDURAL; INFILTRATION; PERINEURAL AS NEEDED
Status: DISCONTINUED | OUTPATIENT
Start: 2024-12-27 | End: 2024-12-28

## 2024-12-27 RX ORDER — BUTORPHANOL TARTRATE 1 MG/ML
1 INJECTION, SOLUTION INTRAMUSCULAR; INTRAVENOUS ONCE
Status: COMPLETED | OUTPATIENT
Start: 2024-12-27 | End: 2024-12-27

## 2024-12-27 RX ORDER — ONDANSETRON 2 MG/ML
4 INJECTION INTRAMUSCULAR; INTRAVENOUS EVERY 6 HOURS PRN
Status: DISCONTINUED | OUTPATIENT
Start: 2024-12-27 | End: 2024-12-28

## 2024-12-27 RX ORDER — ACETAMINOPHEN 325 MG/1
975 TABLET ORAL EVERY 6 HOURS PRN
Status: DISCONTINUED | OUTPATIENT
Start: 2024-12-27 | End: 2024-12-28

## 2024-12-27 RX ORDER — ROPIVACAINE HYDROCHLORIDE 2 MG/ML
INJECTION, SOLUTION EPIDURAL; INFILTRATION; PERINEURAL CONTINUOUS PRN
Status: DISCONTINUED | OUTPATIENT
Start: 2024-12-27 | End: 2024-12-28

## 2024-12-27 RX ORDER — CALCIUM CARBONATE 500 MG/1
500 TABLET, CHEWABLE ORAL DAILY PRN
Status: DISCONTINUED | OUTPATIENT
Start: 2024-12-27 | End: 2024-12-28

## 2024-12-27 RX ORDER — OXYTOCIN/RINGER'S LACTATE 30/500 ML
1-30 PLASTIC BAG, INJECTION (ML) INTRAVENOUS
Status: DISCONTINUED | OUTPATIENT
Start: 2024-12-27 | End: 2024-12-28

## 2024-12-27 RX ORDER — BUPIVACAINE HYDROCHLORIDE 2.5 MG/ML
30 INJECTION, SOLUTION EPIDURAL; INFILTRATION; INTRACAUDAL ONCE AS NEEDED
Status: DISCONTINUED | OUTPATIENT
Start: 2024-12-27 | End: 2024-12-28

## 2024-12-27 RX ORDER — SODIUM CHLORIDE, SODIUM LACTATE, POTASSIUM CHLORIDE, CALCIUM CHLORIDE 600; 310; 30; 20 MG/100ML; MG/100ML; MG/100ML; MG/100ML
125 INJECTION, SOLUTION INTRAVENOUS CONTINUOUS
Status: DISCONTINUED | OUTPATIENT
Start: 2024-12-27 | End: 2024-12-28

## 2024-12-27 RX ADMIN — ONDANSETRON 4 MG: 2 INJECTION INTRAMUSCULAR; INTRAVENOUS at 18:20

## 2024-12-27 RX ADMIN — ROPIVACAINE HYDROCHLORIDE: 2 INJECTION, SOLUTION EPIDURAL; INFILTRATION at 23:43

## 2024-12-27 RX ADMIN — ROPIVACAINE HYDROCHLORIDE 10 ML/HR: 2 INJECTION, SOLUTION EPIDURAL; INFILTRATION at 17:04

## 2024-12-27 RX ADMIN — ROPIVACAINE HYDROCHLORIDE: 2 INJECTION, SOLUTION EPIDURAL; INFILTRATION at 17:20

## 2024-12-27 RX ADMIN — SODIUM CHLORIDE, SODIUM LACTATE, POTASSIUM CHLORIDE, AND CALCIUM CHLORIDE 125 ML/HR: .6; .31; .03; .02 INJECTION, SOLUTION INTRAVENOUS at 13:22

## 2024-12-27 RX ADMIN — TERBUTALINE SULFATE 0.25 MG: 1 INJECTION SUBCUTANEOUS at 20:14

## 2024-12-27 RX ADMIN — CALCIUM CARBONATE (ANTACID) CHEW TAB 500 MG 500 MG: 500 CHEW TAB at 20:27

## 2024-12-27 RX ADMIN — ROPIVACAINE HYDROCHLORIDE 5 ML: 2 INJECTION EPIDURAL; INFILTRATION; PERINEURAL at 17:03

## 2024-12-27 RX ADMIN — Medication 2 MILLI-UNITS/MIN: at 13:28

## 2024-12-27 RX ADMIN — SODIUM CHLORIDE, SODIUM LACTATE, POTASSIUM CHLORIDE, AND CALCIUM CHLORIDE 999 ML/HR: .6; .31; .03; .02 INJECTION, SOLUTION INTRAVENOUS at 21:01

## 2024-12-27 RX ADMIN — BUTORPHANOL TARTRATE 1 MG: 1 INJECTION, SOLUTION INTRAMUSCULAR; INTRAVENOUS at 08:42

## 2024-12-27 RX ADMIN — ROPIVACAINE HYDROCHLORIDE 5 ML: 2 INJECTION EPIDURAL; INFILTRATION; PERINEURAL at 16:58

## 2024-12-27 RX ADMIN — Medication 25 MCG: at 08:54

## 2024-12-27 NOTE — OB LABOR/OXYTOCIN SAFETY PROGRESS
Labor Progress Note - Bijal Davis 24 y.o. female MRN: [id number]    Unit/Bed#: -01 Encounter: [id number]       Contraction Frequency (minutes): 0     Uterine Activity Characteristics: Irritability  Cervical Dilation: 1        Cervical Effacement: 50  Fetal Station: -3  Baseline Rate (FHR): 135 bpm     FHR Category: 1               Vital Signs:   Vitals:    12/27/24 0614   BP: 132/76   Pulse:    Resp:    Temp:        Notes/comments:   Cervical balloon counseling  Discussed risks (AROM, discomfort with placement, possibly of suboptimal dilation if not placed adequately, risk of infection if in place with ROM), benefits (dilation of cervix without medications), alternatives (IV Pitocin or further Cytotec). Discussed that dean would be removed after 12 hours or with ROM unless spontaneously expelled prior. She is agreeable to Dean placement and gave verbal consent prior to placement.    Cervical Balloon Insertion  A 24F dean with a 30cc balloon was selected, a speculum examination was performed and the cervix was located. A dean balloon was introduced over sterile gloved hands. Balloon advanced through cervix  beyond the internal cervical os. A small amount amount of sterile saline solution was instilled in the balloon to confirm placement. Placement was confirmed to be beyond the internal cervical os. A total of 60cc of sterile saline solution was placed into the balloon. Pt tolerated well. Instructions left with RN to place dean on tension. Notify MD when dean dislodged.       Rosa Acuña MD 12/27/2024 8:59 AM

## 2024-12-27 NOTE — OB LABOR/OXYTOCIN SAFETY PROGRESS
Oxytocin Safety Progress Check Note - Bijal Davis 24 y.o. female MRN: [id number]    Unit/Bed#: -01 Encounter: [id number]    Dose (kayley-units/min) Oxytocin: 6 kayley-units/min  Contraction Frequency (minutes): 2-3  Contraction Intensity: Mild/Moderate  Uterine Activity Characteristics: Irregular  Cervical Dilation: 4        Cervical Effacement: 70  Fetal Station: -2  Baseline Rate (FHR): 150 bpm     FHR Category: 2               Vital Signs:   Vitals:    12/27/24 1430   BP: 122/69   Pulse: 76   Resp:    Temp:        Notes/comments:   Patient spontaneously ruptured for clear fluid at 1615, contractions now increased in intensity. Patient bouncing on ball, noted to have variable decelerations, will reposition, receiving IV fluid bolus in anticipation of epidural. Continue pitocin titration pending resolution of variable decelerations.     Rhonda Loomis MD 12/27/2024 4:38 PM

## 2024-12-27 NOTE — OB LABOR/OXYTOCIN SAFETY PROGRESS
Labor Progress Note - Bijal Davis 24 y.o. female MRN: [id number]    Unit/Bed#: -01 Encounter: [id number]       Contraction Frequency (minutes): 2.5-6  Contraction Intensity: Mild  Uterine Activity Characteristics: Irregular  Cervical Dilation: 2        Cervical Effacement: 70  Fetal Station: -2  Baseline Rate (FHR): 155 bpm     FHR Category: 1               Vital Signs:   Vitals:    12/27/24 0945   BP: 109/55   Pulse:    Resp:    Temp:        Notes/comments:   FB came out, patient feeling better after Stadol. Plan to give Pitocin at 1254. Exactly 4 hours after cytotec dose.     Rosa Acuña MD 12/27/2024 10:38 AM

## 2024-12-27 NOTE — OB LABOR/OXYTOCIN SAFETY PROGRESS
Oxytocin Safety Progress Check Note - Bijal Davis 24 y.o. female MRN: [id number]    Unit/Bed#: -01 Encounter: [id number]    Dose (kayley-units/min) Oxytocin: 0 kayley-units/min (Per Dr. Loomis)  Contraction Frequency (minutes): 2-3  Contraction Intensity: Moderate  Uterine Activity Characteristics: Regular  Cervical Dilation: 5        Cervical Effacement: 70  Fetal Station: -2  Baseline Rate (FHR): 145 bpm     FHR Category: 2               Vital Signs:   Vitals:    12/27/24 1822   BP: 135/63   Pulse: 90   Resp:    Temp:    SpO2:        Notes/comments:   SVE as above. Pitocin held after epidural placement due to variable decelerations, which initially improved but have now returned. Will reposition patient and place dean catheter at this time.     Rhonda Loomis MD 12/27/2024 6:50 PM

## 2024-12-27 NOTE — OB LABOR/OXYTOCIN SAFETY PROGRESS
Oxytocin Safety Progress Check Note - Bijal Davis 24 y.o. female MRN: [id number]    Unit/Bed#: -01 Encounter: [id number]    Dose (kayley-units/min) Oxytocin: 4 kayley-units/min  Contraction Frequency (minutes): 2-3  Contraction Intensity: Mild  Uterine Activity Characteristics: Irregular  Cervical Dilation: 2        Cervical Effacement: 70  Fetal Station: -2  Baseline Rate (FHR): 145 bpm     FHR Category: 1               Vital Signs:   Vitals:    12/27/24 1345   BP: 128/75   Pulse:    Resp:    Temp:        Notes/comments:   Patient resting comfortably.  FHT category 1 with contractions every 3-4 min.  Pit started 1 hr ago and currently running at 4.  Will recheck in approximately 2 hr or sooner if clinically indicated.  Dr. Loomis aware.    Trice Trivedi MD 12/27/2024 2:30 PM

## 2024-12-27 NOTE — PLAN OF CARE
Problem: PAIN - ADULT  Goal: Verbalizes/displays adequate comfort level or baseline comfort level  Description: Interventions:  - Encourage patient to monitor pain and request assistance  - Assess pain using appropriate pain scale  - Administer analgesics based on type and severity of pain and evaluate response  - Implement non-pharmacological measures as appropriate and evaluate response  - Consider cultural and social influences on pain and pain management  - Notify physician/advanced practitioner if interventions unsuccessful or patient reports new pain  Outcome: Progressing     Problem: INFECTION - ADULT  Goal: Absence or prevention of progression during hospitalization  Description: INTERVENTIONS:  - Assess and monitor for signs and symptoms of infection  - Monitor lab/diagnostic results  - Monitor all insertion sites, i.e. indwelling lines, tubes, and drains  - Monitor endotracheal if appropriate and nasal secretions for changes in amount and color  - [place] appropriate cooling/warming therapies per order  - Administer medications as ordered  - Instruct and encourage patient and family to use good hand hygiene technique  - Identify and instruct in appropriate isolation precautions for identified infection/condition  Outcome: Progressing  Goal: Absence of fever/infection during neutropenic period  Description: INTERVENTIONS:  - Monitor WBC    Outcome: Progressing     Problem: SAFETY ADULT  Goal: Patient will remain free of falls  Description: INTERVENTIONS:  - Educate patient/family on patient safety including physical limitations  - Instruct patient to call for assistance with activity   - Consult OT/PT to assist with strengthening/mobility   - Keep Call bell within reach  - Keep bed low and locked with side rails adjusted as appropriate  - Keep care items and personal belongings within reach  - Initiate and maintain comfort rounds  - Make Fall Risk Sign visible to staff  - Apply yellow socks and bracelet  for high fall risk patients  - Consider moving patient to room near nurses station  Outcome: Progressing  Goal: Maintain or return to baseline ADL function  Description: INTERVENTIONS:  -  Assess patient's ability to carry out ADLs; assess patient's baseline for ADL function and identify physical deficits which impact ability to perform ADLs (bathing, care of mouth/teeth, toileting, grooming, dressing, etc.)  - Assess/evaluate cause of self-care deficits   - Assess range of motion  - Assess patient's mobility; develop plan if impaired  - Assess patient's need for assistive devices and provide as appropriate  - Encourage maximum independence but intervene and supervise when necessary  - Involve family in performance of ADLs  - Assess for home care needs following discharge   - Consider OT consult to assist with ADL evaluation and planning for discharge  - Provide patient education as appropriate  Outcome: Progressing  Goal: Maintains/Returns to pre admission functional level  Description: INTERVENTIONS:  - Perform AM-PAC 6 Click Basic Mobility/ Daily Activity assessment daily.  - Set and communicate daily mobility goal to care team and patient/family/caregiver.   - Collaborate with rehabilitation services on mobility goals if consulted  - Out of bed for toileting  - Record patient progress and toleration of activity level   Outcome: Progressing     Problem: Knowledge Deficit  Goal: Patient/family/caregiver demonstrates understanding of disease process, treatment plan, medications, and discharge instructions  Description: Complete learning assessment and assess knowledge base.  Interventions:  - Provide teaching at level of understanding  - Provide teaching via preferred learning methods  Outcome: Progressing     Problem: DISCHARGE PLANNING  Goal: Discharge to home or other facility with appropriate resources  Description: INTERVENTIONS:  - Identify barriers to discharge w/patient and caregiver  - Arrange for needed  discharge resources and transportation as appropriate  - Identify discharge learning needs (meds, wound care, etc.)  - Arrange for interpretive services to assist at discharge as needed  - Refer to Case Management Department for coordinating discharge planning if the patient needs post-hospital services based on physician/advanced practitioner order or complex needs related to functional status, cognitive ability, or social support system  Outcome: Progressing     Problem: ANTEPARTUM  Goal: Maintain pregnancy as long as maternal and/or fetal condition is stable  Description: INTERVENTIONS:  - Maternal surveillance  - Fetal surveillance  - Monitor uterine activity  - Medications as ordered  - Bedrest  Outcome: Progressing     Problem: BIRTH - VAGINAL/ SECTION  Goal: Fetal and maternal status remain reassuring during the birth process  Description: INTERVENTIONS:  - Monitor vital signs  - Monitor fetal heart rate  - Monitor uterine activity  - Monitor labor progression (vaginal delivery)  - DVT prophylaxis  - Antibiotic prophylaxis  Outcome: Progressing  Goal: Emotionally satisfying birthing experience for mother/fetus  Description: Interventions:  - Assess, plan, implement and evaluate the nursing care given to the patient in labor  - Advocate the philosophy that each childbirth experience is a unique experience and support the family's chosen level of involvement and control during the labor process   - Actively participate in both the patient's and family's teaching of the birth process  - Consider cultural, Sabianist and age-specific factors and plan care for the patient in labor  Outcome: Progressing

## 2024-12-27 NOTE — H&P
"H & P- Obstetrics   Bijal Davis 24 y.o. female MRN: [id number]  Unit/Bed#: -01 Encounter: [id number]    Assessment: 24 y.o.  at 39w0d admitted for elective IOL.  SVE: /-3  FHT: cat 1  Clinical EFW: 7 lbs; Cephalic confirmed by TAUS  GBS status: negative     Plan:   Nicotine use  Assessment & Plan  Vapes daily   Declines nicotine patch    Insufficient prenatal care  Assessment & Plan  No routine prenatal care 12-30 weeks other than ultrasound   Offer CM consult postpartum    Depression affecting pregnancy in third trimester, antepartum  Assessment & Plan  Not currently on medication    39 weeks gestation of pregnancy  Assessment & Plan  PNL wnl  B+  GBS neg  EFW 3 lbs 6 oz (20%) at 30w6d    Mild intermittent asthma without complication  Assessment & Plan  Avoid hemabate    * Encounter for induction of labor  Assessment & Plan  Admit for eIOL  Admission labs - CBC, T&S, syphilis screen  Clear liquid diet  Anesthesia consult placed   Rh+, Rhogam not indicated   GBS-, prophylaxis not indicated  IOL with FB/cytotec, transition to pitocin        Discussed case and plan w/ Dr. Sandoval.    Chief Complaint: \"I'm here for my induction.\"    HPI: Bijal Davis is a 24 y.o.  with an ANDRÉS of 1/3/2025, by Ultrasound at 39w0d who is being admitted for scheduled elective induction of labor. She reports occasional [place] contractions, has no LOF, and reports no VB. She states she has felt good FM.    Patient Active Problem List   Diagnosis    Mild intermittent asthma without complication    Severe episode of recurrent major depressive disorder, without psychotic features (HCC)    Pregnancy complicated by tobacco use, third trimester    39 weeks gestation of pregnancy    Insufficient prenatal care in third trimester    Depression affecting pregnancy in third trimester, antepartum    Encounter for induction of labor    Insufficient prenatal care    Nicotine use       Baby " complications/comments: none    Review of Systems   Constitutional:  Negative for chills and fever.   Respiratory:  Negative for cough and shortness of breath.    Gastrointestinal:  Negative for diarrhea, nausea and vomiting.   Genitourinary:  Negative for dysuria and hematuria.   Skin:  Negative for color change and rash.   Neurological:  Negative for dizziness, syncope and headaches.       OB Hx:  OB History    Para Term  AB Living   4 1 1  2 1   SAB IAB Ectopic Multiple Live Births   1 1   1      # Outcome Date GA Lbr Craig/2nd Weight Sex Type Anes PTL Lv   4 Current            3 IAB  6w0d          2 Term 18 41w2d / 00:49 3410 g (7 lb 8.3 oz) F Vag-Spont EPI  SCARLETT   1 SAB 2016 5w0d              Past Medical Hx:  Past Medical History:   Diagnosis Date    Anxiety     Asthma     Bipolar 1 disorder (HCC)     Depression     Varicella        Past Surgical hx:  Past Surgical History:   Procedure Laterality Date    NO PAST SURGERIES         Social Hx:  Alcohol use: denies  Tobacco use: daily vape user  Other substance use: denies    Allergies   Allergen Reactions    Pollen Extract Drowsiness         Medications Prior to Admission:     buPROPion (WELLBUTRIN XL) 300 mg 24 hr tablet    escitalopram (LEXAPRO) 10 mg tablet    Prenatal Vit-Fe Fumarate-FA (Prenatal Complete) 14-0.4 MG TABS    Objective:     There is no height or weight on file to calculate BMI.     Physical Exam:  Physical Exam  Constitutional:       Appearance: Normal appearance.   Genitourinary:      Vulva normal.   Cardiovascular:      Rate and Rhythm: Normal rate.   Pulmonary:      Effort: Pulmonary effort is normal.   Abdominal:      Palpations: Abdomen is soft.   Neurological:      General: No focal deficit present.      Mental Status: She is alert and oriented to person, place, and time.   Skin:     General: Skin is dry.   Psychiatric:         Mood and Affect: Mood normal.            FHT:  Baseline Rate (FHR): 145  bpm  Variability: Moderate  Accelerations: 15 x 15 or greater  Decelerations: None  FHR Category: Category I    TOCO:   Contraction Frequency (minutes): 0    Lab Results   Component Value Date    WBC 9.04 10/28/2024    HGB 11.3 (L) 10/28/2024    HCT 34.9 10/28/2024     10/28/2024     Lab Results   Component Value Date     12/30/2015    K 3.3 (L) 05/15/2024     05/15/2024    CO2 25 05/15/2024    BUN 7 05/15/2024    CREATININE 0.71 05/15/2024    GLUCOSE 146 (H) 12/30/2015    AST 12 (L) 05/15/2024    ALT 13 05/15/2024     Prenatal Labs: Reviewed      Blood type: B+  Antibody: negative  GBS: negative  HIV: non-reactive  Rubella: immune  Syphilis IgM/IgG: non-reactive  HBsAg: non-reactive  HCAb: non-reactive  Chlamydia: negative  Gonorrhea: negative  Diabetes 1 hour screen: passed  3 hour glucose: n/a  Platelets: 237, pending admission CBC  Hgb: 113, pending admission CBC  >2 Midnights  INPATIENT     Signature/Title: Estefanía Ch MD  Date: 12/27/2024  Time: 6:07 AM

## 2024-12-27 NOTE — ANESTHESIA PROCEDURE NOTES
CSE Block    Patient location during procedure: OB  Start time: 12/27/2024 4:56 PM  Reason for block: procedure for pain and at surgeon's request  Staffing  Performed by: Katina Roy DO  Authorized by: Katina Roy DO    Preanesthetic Checklist  Completed: patient identified, IV checked, site marked, risks and benefits discussed, surgical consent, monitors and equipment checked, pre-op evaluation and timeout performed  CSE  Patient position: sitting  Prep: Betadine  Patient monitoring: cardiac monitor, continuous pulse ox and frequent blood pressure checks  Approach: midline  Spinal Needle  Needle type: pencil-tip   Needle gauge: 27 G  Needle length: 13 cm  Epidural Needle  Injection technique: PAOLA air  Needle type: Tuohy   Needle gauge: 18 G  Needle length: 10 cm  Needle insertion depth: 6 cm  Location: L3-L4  Catheter  Catheter type: side hole  Catheter size: 20 G  Catheter at skin depth: 10 cm  Catheter securement method: clear occlusive dressing  Test dose: negative  Assessment  Injection Assessment:  negative aspiration for heme, no paresthesia on injection, positive aspiration for clear CSF and no pain on injection.

## 2024-12-27 NOTE — ANESTHESIA PREPROCEDURE EVALUATION
Procedure:  LABOR ANALGESIA    Relevant Problems   GYN   (+) 39 weeks gestation of pregnancy      NEURO/PSYCH   (+) Depression affecting pregnancy in third trimester, antepartum   (+) Severe episode of recurrent major depressive disorder, without psychotic features (HCC)      PULMONARY   (+) Mild intermittent asthma without complication      Behavioral Health   (+) Bipolar 1 disorder (HCC)   (+) Nicotine use   (+) Pregnancy complicated by tobacco use, third trimester      Obstetrics/Gynecology   (+) Insufficient prenatal care      Other   (+) Encounter for induction of labor        Physical Exam    Airway    Mallampati score: I  TM Distance: >3 FB       Dental   No notable dental hx     Cardiovascular  Cardiovascular exam normal    Pulmonary  Pulmonary exam normal     Other Findings  post-pubertal.      Anesthesia Plan  ASA Score- 3     Anesthesia Type- epidural with ASA Monitors.         Additional Monitors:     Airway Plan:            Plan Factors-Exercise tolerance (METS): >4 METS.    Chart reviewed.   Existing labs reviewed.     Patient is a current smoker.  Patient instructed to abstain from smoking on day of procedure. Patient smoked on day of surgery.            Induction-     Postoperative Plan-     Perioperative Resuscitation Plan - Level 1 - Full Code.       Informed Consent- Anesthetic plan and risks discussed with patient.

## 2024-12-27 NOTE — ASSESSMENT & PLAN NOTE
Admit for eIOL  Admission labs - CBC, T&S, syphilis screen  Clear liquid diet  Anesthesia consult placed   Rh+, Rhogam not indicated   GBS-, prophylaxis not indicated  IOL with FB/cytotec, transition to pitocin

## 2024-12-27 NOTE — OB LABOR/OXYTOCIN SAFETY PROGRESS
Oxytocin Safety Progress Check Note - Bijal Davis 24 y.o. female MRN: [id number]    Unit/Bed#: -01 Encounter: [id number]    Dose (kayley-units/min) Oxytocin: 6 kayley-units/min  Contraction Frequency (minutes): 9  Contraction Intensity: Mild  Uterine Activity Characteristics: Irregular  Cervical Dilation: 3        Cervical Effacement: 70  Fetal Station: -2  Baseline Rate (FHR): 150 bpm     FHR Category: 1               Vital Signs:   Vitals:    12/27/24 1430   BP: 122/69   Pulse: 76   Resp:    Temp:        Notes/comments:   SVE as above. Discussed AROM, patient requests at next check, may desire epidural first. FHT category 1. Continue pitocin titration.     Rhonda Loomis MD 12/27/2024 3:48 PM

## 2024-12-28 PROBLEM — Z98.891 S/P CESAREAN SECTION: Status: ACTIVE | Noted: 2024-06-24

## 2024-12-28 PROBLEM — O14.90 PREECLAMPSIA: Status: ACTIVE | Noted: 2024-12-28

## 2024-12-28 PROBLEM — O13.9 GESTATIONAL HYPERTENSION: Status: ACTIVE | Noted: 2024-12-28

## 2024-12-28 LAB
BASE EXCESS BLDCOA CALC-SCNC: -4.2 MMOL/L (ref 3–11)
BASE EXCESS BLDCOV CALC-SCNC: -2.6 MMOL/L (ref 1–9)
CREAT UR-MCNC: 72.3 MG/DL
ERYTHROCYTE [DISTWIDTH] IN BLOOD BY AUTOMATED COUNT: 13.2 % (ref 11.6–15.1)
HCO3 BLDCOA-SCNC: 23.9 MMOL/L (ref 17.3–27.3)
HCO3 BLDCOV-SCNC: 22.8 MMOL/L (ref 12.2–28.6)
HCT VFR BLD AUTO: 30.8 % (ref 34.8–46.1)
HGB BLD-MCNC: 10.2 G/DL (ref 11.5–15.4)
MCH RBC QN AUTO: 28.8 PG (ref 26.8–34.3)
MCHC RBC AUTO-ENTMCNC: 33.1 G/DL (ref 31.4–37.4)
MCV RBC AUTO: 87 FL (ref 82–98)
O2 CT VFR BLDCOA CALC: 12.1 ML/DL
OXYHGB MFR BLDCOA: 56.4 %
OXYHGB MFR BLDCOV: 53.8 %
PCO2 BLDCOA: 56.1 MM[HG] (ref 30–60)
PCO2 BLDCOV: 41.6 MM HG (ref 27–43)
PH BLDCOA: 7.25 [PH] (ref 7.23–7.43)
PH BLDCOV: 7.36 [PH] (ref 7.19–7.49)
PLATELET # BLD AUTO: 204 THOUSANDS/UL (ref 149–390)
PMV BLD AUTO: 10 FL (ref 8.9–12.7)
PO2 BLDCOA: 27.3 MM HG (ref 5–25)
PO2 BLDCOV: 22.7 MM HG (ref 15–45)
PROT UR-MCNC: 52.8 MG/DL
PROT/CREAT UR: 0.7 MG/G{CREAT} (ref 0–0.1)
RBC # BLD AUTO: 3.54 MILLION/UL (ref 3.81–5.12)
SAO2 % BLDCOV: 11 ML/DL
WBC # BLD AUTO: 18.5 THOUSAND/UL (ref 4.31–10.16)

## 2024-12-28 PROCEDURE — 82805 BLOOD GASES W/O2 SATURATION: CPT | Performed by: OBSTETRICS & GYNECOLOGY

## 2024-12-28 PROCEDURE — 82570 ASSAY OF URINE CREATININE: CPT

## 2024-12-28 PROCEDURE — 84156 ASSAY OF PROTEIN URINE: CPT

## 2024-12-28 PROCEDURE — 59514 CESAREAN DELIVERY ONLY: CPT | Performed by: OBSTETRICS & GYNECOLOGY

## 2024-12-28 PROCEDURE — NC001 PR NO CHARGE: Performed by: OBSTETRICS & GYNECOLOGY

## 2024-12-28 PROCEDURE — 88307 TISSUE EXAM BY PATHOLOGIST: CPT | Performed by: PATHOLOGY

## 2024-12-28 PROCEDURE — 85027 COMPLETE CBC AUTOMATED: CPT

## 2024-12-28 RX ORDER — METOCLOPRAMIDE HYDROCHLORIDE 5 MG/ML
INJECTION INTRAMUSCULAR; INTRAVENOUS AS NEEDED
Status: DISCONTINUED | OUTPATIENT
Start: 2024-12-28 | End: 2024-12-28

## 2024-12-28 RX ORDER — ONDANSETRON 2 MG/ML
INJECTION INTRAMUSCULAR; INTRAVENOUS
Status: DISPENSED
Start: 2024-12-28 | End: 2024-12-28

## 2024-12-28 RX ORDER — ACETAMINOPHEN 325 MG/1
650 TABLET ORAL EVERY 6 HOURS SCHEDULED
Status: DISCONTINUED | OUTPATIENT
Start: 2024-12-28 | End: 2024-12-30 | Stop reason: HOSPADM

## 2024-12-28 RX ORDER — PHENYLEPHRINE HCL IN 0.9% NACL 1 MG/10 ML
SYRINGE (ML) INTRAVENOUS AS NEEDED
Status: DISCONTINUED | OUTPATIENT
Start: 2024-12-28 | End: 2024-12-28

## 2024-12-28 RX ORDER — DIPHENHYDRAMINE HYDROCHLORIDE 50 MG/ML
25 INJECTION INTRAMUSCULAR; INTRAVENOUS EVERY 6 HOURS PRN
Status: DISCONTINUED | OUTPATIENT
Start: 2024-12-28 | End: 2024-12-30 | Stop reason: HOSPADM

## 2024-12-28 RX ORDER — SODIUM CHLORIDE, SODIUM LACTATE, POTASSIUM CHLORIDE, CALCIUM CHLORIDE 600; 310; 30; 20 MG/100ML; MG/100ML; MG/100ML; MG/100ML
125 INJECTION, SOLUTION INTRAVENOUS CONTINUOUS
Status: DISCONTINUED | OUTPATIENT
Start: 2024-12-28 | End: 2024-12-30 | Stop reason: HOSPADM

## 2024-12-28 RX ORDER — LIDOCAINE HCL/EPINEPHRINE/PF 2%-1:200K
VIAL (ML) INJECTION AS NEEDED
Status: DISCONTINUED | OUTPATIENT
Start: 2024-12-28 | End: 2024-12-28

## 2024-12-28 RX ORDER — ONDANSETRON 2 MG/ML
INJECTION INTRAMUSCULAR; INTRAVENOUS AS NEEDED
Status: DISCONTINUED | OUTPATIENT
Start: 2024-12-28 | End: 2024-12-28

## 2024-12-28 RX ORDER — SIMETHICONE 80 MG
80 TABLET,CHEWABLE ORAL 4 TIMES DAILY PRN
Status: DISCONTINUED | OUTPATIENT
Start: 2024-12-28 | End: 2024-12-30 | Stop reason: HOSPADM

## 2024-12-28 RX ORDER — KETOROLAC TROMETHAMINE 30 MG/ML
INJECTION, SOLUTION INTRAMUSCULAR; INTRAVENOUS AS NEEDED
Status: DISCONTINUED | OUTPATIENT
Start: 2024-12-28 | End: 2024-12-28

## 2024-12-28 RX ORDER — OXYCODONE HYDROCHLORIDE 5 MG/1
5 TABLET ORAL EVERY 4 HOURS PRN
Status: DISCONTINUED | OUTPATIENT
Start: 2024-12-28 | End: 2024-12-29 | Stop reason: SDUPTHER

## 2024-12-28 RX ORDER — MORPHINE SULFATE 0.5 MG/ML
INJECTION, SOLUTION EPIDURAL; INTRATHECAL; INTRAVENOUS AS NEEDED
Status: DISCONTINUED | OUTPATIENT
Start: 2024-12-28 | End: 2024-12-28

## 2024-12-28 RX ORDER — ONDANSETRON 2 MG/ML
4 INJECTION INTRAMUSCULAR; INTRAVENOUS EVERY 6 HOURS PRN
Status: DISPENSED | OUTPATIENT
Start: 2024-12-28 | End: 2024-12-29

## 2024-12-28 RX ORDER — ONDANSETRON 2 MG/ML
4 INJECTION INTRAMUSCULAR; INTRAVENOUS EVERY 8 HOURS PRN
Status: DISCONTINUED | OUTPATIENT
Start: 2024-12-28 | End: 2024-12-30 | Stop reason: HOSPADM

## 2024-12-28 RX ORDER — SENNOSIDES 8.6 MG
1 TABLET ORAL DAILY
Status: DISCONTINUED | OUTPATIENT
Start: 2024-12-28 | End: 2024-12-30 | Stop reason: HOSPADM

## 2024-12-28 RX ORDER — OXYTOCIN/RINGER'S LACTATE 30/500 ML
62.5 PLASTIC BAG, INJECTION (ML) INTRAVENOUS ONCE
Status: COMPLETED | OUTPATIENT
Start: 2024-12-28 | End: 2024-12-28

## 2024-12-28 RX ORDER — METOCLOPRAMIDE HYDROCHLORIDE 5 MG/ML
INJECTION INTRAMUSCULAR; INTRAVENOUS
Status: DISPENSED
Start: 2024-12-28 | End: 2024-12-28

## 2024-12-28 RX ORDER — TRANEXAMIC ACID 100 MG/ML
INJECTION, SOLUTION INTRAVENOUS
Status: DISPENSED
Start: 2024-12-28 | End: 2024-12-28

## 2024-12-28 RX ORDER — PHENYLEPHRINE HCL IN 0.9% NACL 1 MG/10 ML
SYRINGE (ML) INTRAVENOUS
Status: COMPLETED
Start: 2024-12-28 | End: 2024-12-28

## 2024-12-28 RX ORDER — CEFAZOLIN SODIUM 2 G/50ML
2000 SOLUTION INTRAVENOUS ONCE
Status: COMPLETED | OUTPATIENT
Start: 2024-12-28 | End: 2024-12-28

## 2024-12-28 RX ORDER — OXYCODONE HYDROCHLORIDE 5 MG/1
5 TABLET ORAL EVERY 4 HOURS PRN
Status: DISCONTINUED | OUTPATIENT
Start: 2024-12-29 | End: 2024-12-30 | Stop reason: HOSPADM

## 2024-12-28 RX ORDER — NALOXONE HYDROCHLORIDE 0.4 MG/ML
0.1 INJECTION, SOLUTION INTRAMUSCULAR; INTRAVENOUS; SUBCUTANEOUS
Status: ACTIVE | OUTPATIENT
Start: 2024-12-28 | End: 2024-12-29

## 2024-12-28 RX ORDER — IBUPROFEN 600 MG/1
600 TABLET, FILM COATED ORAL EVERY 6 HOURS
Status: DISCONTINUED | OUTPATIENT
Start: 2024-12-29 | End: 2024-12-29

## 2024-12-28 RX ORDER — MORPHINE SULFATE 0.5 MG/ML
INJECTION, SOLUTION EPIDURAL; INTRATHECAL; INTRAVENOUS
Status: COMPLETED
Start: 2024-12-28 | End: 2024-12-28

## 2024-12-28 RX ORDER — KETOROLAC TROMETHAMINE 30 MG/ML
30 INJECTION, SOLUTION INTRAMUSCULAR; INTRAVENOUS EVERY 6 HOURS SCHEDULED
Status: DISCONTINUED | OUTPATIENT
Start: 2024-12-28 | End: 2024-12-29

## 2024-12-28 RX ORDER — SODIUM CHLORIDE, SODIUM LACTATE, POTASSIUM CHLORIDE, CALCIUM CHLORIDE 600; 310; 30; 20 MG/100ML; MG/100ML; MG/100ML; MG/100ML
75 INJECTION, SOLUTION INTRAVENOUS CONTINUOUS
Status: DISCONTINUED | OUTPATIENT
Start: 2024-12-28 | End: 2024-12-28

## 2024-12-28 RX ORDER — KETOROLAC TROMETHAMINE 30 MG/ML
INJECTION, SOLUTION INTRAMUSCULAR; INTRAVENOUS
Status: COMPLETED
Start: 2024-12-28 | End: 2024-12-28

## 2024-12-28 RX ORDER — OXYCODONE HYDROCHLORIDE 5 MG/1
10 TABLET ORAL EVERY 4 HOURS PRN
Status: DISCONTINUED | OUTPATIENT
Start: 2024-12-29 | End: 2024-12-30 | Stop reason: HOSPADM

## 2024-12-28 RX ORDER — NALBUPHINE HYDROCHLORIDE 10 MG/ML
5 INJECTION INTRAMUSCULAR; INTRAVENOUS; SUBCUTANEOUS
Status: ACTIVE | OUTPATIENT
Start: 2024-12-28 | End: 2024-12-29

## 2024-12-28 RX ORDER — CALCIUM CARBONATE 500 MG/1
1000 TABLET, CHEWABLE ORAL DAILY PRN
Status: DISCONTINUED | OUTPATIENT
Start: 2024-12-28 | End: 2024-12-30 | Stop reason: HOSPADM

## 2024-12-28 RX ORDER — METOCLOPRAMIDE HYDROCHLORIDE 5 MG/ML
10 INJECTION INTRAMUSCULAR; INTRAVENOUS EVERY 6 HOURS PRN
Status: DISCONTINUED | OUTPATIENT
Start: 2024-12-28 | End: 2024-12-30 | Stop reason: HOSPADM

## 2024-12-28 RX ORDER — TRANEXAMIC ACID 100 MG/ML
INJECTION, SOLUTION INTRAVENOUS AS NEEDED
Status: DISCONTINUED | OUTPATIENT
Start: 2024-12-28 | End: 2024-12-28

## 2024-12-28 RX ADMIN — ONDANSETRON 4 MG: 2 INJECTION INTRAMUSCULAR; INTRAVENOUS at 02:28

## 2024-12-28 RX ADMIN — ACETAMINOPHEN 650 MG: 325 TABLET, FILM COATED ORAL at 15:37

## 2024-12-28 RX ADMIN — TRANEXAMIC ACID 1 G: 100 INJECTION, SOLUTION INTRAVENOUS at 05:15

## 2024-12-28 RX ADMIN — Medication 150 MCG: at 05:18

## 2024-12-28 RX ADMIN — KETOROLAC TROMETHAMINE 30 MG: 30 INJECTION, SOLUTION INTRAMUSCULAR; INTRAVENOUS at 05:32

## 2024-12-28 RX ADMIN — LIDOCAINE HYDROCHLORIDE,EPINEPHRINE BITARTRATE 5 ML: 20; .005 INJECTION, SOLUTION EPIDURAL; INFILTRATION; INTRACAUDAL; PERINEURAL at 04:52

## 2024-12-28 RX ADMIN — MORPHINE SULFATE 3 MG: 0.5 INJECTION, SOLUTION EPIDURAL; INTRATHECAL; INTRAVENOUS at 05:15

## 2024-12-28 RX ADMIN — METOCLOPRAMIDE 10 MG: 5 INJECTION, SOLUTION INTRAMUSCULAR; INTRAVENOUS at 12:07

## 2024-12-28 RX ADMIN — ACETAMINOPHEN 650 MG: 325 TABLET, FILM COATED ORAL at 21:43

## 2024-12-28 RX ADMIN — Medication 100 MCG: at 04:58

## 2024-12-28 RX ADMIN — METOCLOPRAMIDE 10 MG: 5 INJECTION, SOLUTION INTRAMUSCULAR; INTRAVENOUS at 05:18

## 2024-12-28 RX ADMIN — SODIUM CHLORIDE, SODIUM LACTATE, POTASSIUM CHLORIDE, AND CALCIUM CHLORIDE 125 ML/HR: .6; .31; .03; .02 INJECTION, SOLUTION INTRAVENOUS at 06:31

## 2024-12-28 RX ADMIN — ACETAMINOPHEN 650 MG: 325 TABLET, FILM COATED ORAL at 09:21

## 2024-12-28 RX ADMIN — SODIUM CHLORIDE, SODIUM LACTATE, POTASSIUM CHLORIDE, AND CALCIUM CHLORIDE 300 ML: .6; .31; .03; .02 INJECTION, SOLUTION INTRAVENOUS at 02:45

## 2024-12-28 RX ADMIN — ONDANSETRON 4 MG: 2 INJECTION INTRAMUSCULAR; INTRAVENOUS at 04:58

## 2024-12-28 RX ADMIN — Medication 150 MCG: at 05:27

## 2024-12-28 RX ADMIN — SODIUM CHLORIDE, SODIUM LACTATE, POTASSIUM CHLORIDE, AND CALCIUM CHLORIDE 125 ML/HR: .6; .31; .03; .02 INJECTION, SOLUTION INTRAVENOUS at 06:00

## 2024-12-28 RX ADMIN — KETOROLAC TROMETHAMINE 30 MG: 30 INJECTION, SOLUTION INTRAMUSCULAR; INTRAVENOUS at 18:13

## 2024-12-28 RX ADMIN — CEFAZOLIN SODIUM 2000 MG: 2 SOLUTION INTRAVENOUS at 04:51

## 2024-12-28 RX ADMIN — LIDOCAINE HYDROCHLORIDE,EPINEPHRINE BITARTRATE 5 ML: 20; .005 INJECTION, SOLUTION EPIDURAL; INFILTRATION; INTRACAUDAL; PERINEURAL at 04:56

## 2024-12-28 RX ADMIN — AZITHROMYCIN MONOHYDRATE 500 MG: 500 INJECTION, POWDER, LYOPHILIZED, FOR SOLUTION INTRAVENOUS at 04:59

## 2024-12-28 RX ADMIN — ONDANSETRON 4 MG: 2 INJECTION INTRAMUSCULAR; INTRAVENOUS at 09:28

## 2024-12-28 RX ADMIN — KETOROLAC TROMETHAMINE 30 MG: 30 INJECTION, SOLUTION INTRAMUSCULAR; INTRAVENOUS at 12:02

## 2024-12-28 RX ADMIN — Medication 62.5 MILLI-UNITS/MIN: at 06:18

## 2024-12-28 RX ADMIN — LIDOCAINE HYDROCHLORIDE,EPINEPHRINE BITARTRATE 5 ML: 20; .005 INJECTION, SOLUTION EPIDURAL; INFILTRATION; INTRACAUDAL; PERINEURAL at 04:48

## 2024-12-28 RX ADMIN — SODIUM CHLORIDE, SODIUM LACTATE, POTASSIUM CHLORIDE, AND CALCIUM CHLORIDE 125 ML/HR: .6; .31; .03; .02 INJECTION, SOLUTION INTRAVENOUS at 01:48

## 2024-12-28 NOTE — OB LABOR/OXYTOCIN SAFETY PROGRESS
Oxytocin Safety Progress Check Note - Bijal Davis 24 y.o. female MRN: [id number]    Unit/Bed#: -01 Encounter: [id number]    Dose (kayley-units/min) Oxytocin: 0 kayley-units/min (per MD Ch d/t FHT)  Contraction Frequency (minutes): 2-4  Contraction Intensity: Moderate  Uterine Activity Characteristics: Regular  Cervical Dilation: 7        Cervical Effacement: 80  Fetal Station: -2  Baseline Rate (FHR): 155 bpm     FHR Category: 2               Vital Signs:   Vitals:    12/27/24 2321   BP: 116/57   Pulse: (!) 106   Resp:    Temp:    SpO2:        FHT notable for large variable deceleration. SVE unchanged. Pitocin turned off and mom repositioned with subsequent FHT improvement. Will keep pitocin off for at least 20 minutes of good strip, then reevaluate and consider pitocin back on at 1 mU/min.    Estefanía Ch MD 12/27/2024 11:40 PM

## 2024-12-28 NOTE — OB LABOR/OXYTOCIN SAFETY PROGRESS
Labor Progress Note - Bijal Davis 24 y.o. female MRN: [id number]    Unit/Bed#: -01 Encounter: [id number]    Dose (kayley-units/min) Oxytocin: 0 kayley-units/min (Per Dr. Loomis)  Contraction Frequency (minutes): difficult to trace  Contraction Intensity: Moderate  Uterine Activity Characteristics: Irritability  Cervical Dilation: 5-6        Cervical Effacement: 80  Fetal Station: -2  Baseline Rate (FHR): 145 bpm     FHR Category: 2               Vital Signs:   Vitals:    12/27/24 2012   BP: 132/67   Pulse: 100   Resp:    Temp:    SpO2: 93%       FHT notable for prolonged deceleration. SVE with some change, as above. FHT noted to intermittently improve but without complete resolution and then decelerate again. Difficulty tracing contractions, but belly noted to be firm throughout deceleration. IUPC and FSE placed. Terbutaline administered.IVF opened wide. FHT with recovery back to baseline of approx 150 after interventions. Will continue to monitor closely. Dr. Loomis at bedside.    Estefanía Ch MD 12/27/2024 8:19 PM

## 2024-12-28 NOTE — DISCHARGE SUMMARY
Discharge Summary - OB/GYN   Name: Bijal Davis 24 y.o. female I MRN: [id number]  Unit/Bed#: -01 I Date of Admission: 2024   Date of Service: 2024 I Hospital Day: 3    ADMISSION  Patient of: [facility]  Admission Date: 2024   Admitting Attending: Dr. Rhonda Loomis MD  Admitting Diagnoses:   Patient Active Problem List   Diagnosis    Mild intermittent asthma without complication    Severe episode of recurrent major depressive disorder, without psychotic features (HCC)    Pregnancy complicated by tobacco use, third trimester    S/P  section    Insufficient prenatal care in third trimester    Depression affecting pregnancy in third trimester, antepartum    Insufficient prenatal care    Nicotine use    Bipolar 1 disorder (HCC)    Preeclampsia       DELIVERY  Delivery Method:     Delivery Date and Time: 2024 5:08 AM  Delivery Attending: Rhonda Loomis    DISCHARGE  Discharge Date: 24   Discharge Attending: Dr. Rhonda Loomis MD  Discharge Diagnosis:   Same, Delivered  Preeclampsia without severe features without need for medication.     Clinical course: Admission to Delivery  Bijal Davis is a 24 y.o.  who was admitted at 39w1d for elective induction of labor. She was noted to be 1/50/-3 on admission. A dean balloon was placed and she received cytotec for cervical ripening. Pitocin was subsequently started. Patient's membranes spontaneously ruptured. She opted for an epidural for pain management. She continued to progress in her labor course. She  then had a prolonged deceleration at which time an IUPC and FSE were placed. Terbutaline was administered and pitocin stopped. Attempted to restart pitocin were made but patient continued to have decelerations. At that time, a decision was made to proceed to OR for  section.     Reason for induction: elective    Delivery  Route of Delivery:  1LTCS  Reason for  delivery:    persistent category II strip    Anesthesia:  epidural  QBL: 684cc    Delivery:   at 2024 5:08 AM    Baby's Weight: 3090 g (6 lb 13 oz); 109    Apgar scores: 8  and 9  at 1 and 5 minutes, respectively    Clinical Course: Post-Delivery:  The post delivery course was unremarkable.    On the day of discharge, the patient was ambulating, voiding spontaneously, tolerating oral intake, and hemodynamically stable. She was able to reasonably perform all ADLs. She had appropriate bowel function. Pain was well-controlled. She was discharged home on postpartum/postop day #2 without complications. Patient was instructed to follow up with her OB as an outpatient and was given appropriate warnings to call her provider with problems or concerns.    Pertinent lab findings included:   Blood type B+.     Last three Hgb values:  Lab Results   Component Value Date    HGB 10.2 (L) 2024    HGB 12.5 2024    HGB 11.3 (L) 10/28/2024        Problem-specific follow-up plans included the following:  Assessment & Plan  Mild intermittent asthma without complication  Avoid hemabate  Pregnancy complicated by tobacco use, third trimester    S/P  section  PNL wnl  B+  GBS neg  EFW 3 lbs 6 oz (20%) at 30w6d  Insufficient prenatal care in third trimester    Depression affecting pregnancy in third trimester, antepartum  Not currently on medication  Insufficient prenatal care  No routine prenatal care 12-30 weeks other than ultrasound   Offer CM consult postpartum  Nicotine use  Vapes daily   Declines nicotine patch  Bipolar 1 disorder (HCC)    Preeclampsia  Asymptomatic  BP normotensive    Discharge med list:     Medication List      START taking these medications     acetaminophen 325 mg tablet; Commonly known as: TYLENOL; Take 2 tablets   (650 mg total) by mouth every 6 (six) hours for 6 doses   ibuprofen 600 mg tablet; Commonly known as: MOTRIN; Take 1 tablet (600   mg total) by mouth every 6 (six) hours     CONTINUE taking  these medications     Prenatal Complete 14-0.4 MG Tabs; Take 1 tablet by mouth daily     ASK your doctor about these medications     buPROPion 300 mg 24 hr tablet; Commonly known as: WELLBUTRIN XL   escitalopram 10 mg tablet; Commonly known as: LEXAPRO       Condition at discharge:   good     Disposition:   Home    Planned Readmission:   Kym Pressley  PGY-1 OB/GYN  12/30/24  8:14 AM

## 2024-12-28 NOTE — PLAN OF CARE
Problem: PAIN - ADULT  Goal: Verbalizes/displays adequate comfort level or baseline comfort level  Description: Interventions:  - Encourage patient to monitor pain and request assistance  - Assess pain using appropriate pain scale  - Administer analgesics based on type and severity of pain and evaluate response  - Implement non-pharmacological measures as appropriate and evaluate response  - Consider cultural and social influences on pain and pain management  - Notify physician/advanced practitioner if interventions unsuccessful or patient reports new pain  Outcome: Progressing     Problem: INFECTION - ADULT  Goal: Absence or prevention of progression during hospitalization  Description: INTERVENTIONS:  - Assess and monitor for signs and symptoms of infection  - Monitor lab/diagnostic results  - Monitor all insertion sites, i.e. indwelling lines, tubes, and drains  - Monitor endotracheal if appropriate and nasal secretions for changes in amount and color  - Bishop appropriate cooling/warming therapies per order  - Administer medications as ordered  - Instruct and encourage patient and family to use good hand hygiene technique  - Identify and instruct in appropriate isolation precautions for identified infection/condition  Outcome: Progressing  Goal: Absence of fever/infection during neutropenic period  Description: INTERVENTIONS:  - Monitor WBC    Outcome: Progressing     Problem: SAFETY ADULT  Goal: Patient will remain free of falls  Description: INTERVENTIONS:  - Educate patient/family on patient safety including physical limitations  - Instruct patient to call for assistance with activity   - Consult OT/PT to assist with strengthening/mobility   - Keep Call bell within reach  - Keep bed low and locked with side rails adjusted as appropriate  - Keep care items and personal belongings within reach  - Initiate and maintain comfort rounds  - Make Fall Risk Sign visible to staff  - Apply yellow socks and bracelet  for high fall risk patients  - Consider moving patient to room near nurses station  Outcome: Progressing  Goal: Maintain or return to baseline ADL function  Description: INTERVENTIONS:  -  Assess patient's ability to carry out ADLs; assess patient's baseline for ADL function and identify physical deficits which impact ability to perform ADLs (bathing, care of mouth/teeth, toileting, grooming, dressing, etc.)  - Assess/evaluate cause of self-care deficits   - Assess range of motion  - Assess patient's mobility; develop plan if impaired  - Assess patient's need for assistive devices and provide as appropriate  - Encourage maximum independence but intervene and supervise when necessary  - Involve family in performance of ADLs  - Assess for home care needs following discharge   - Consider OT consult to assist with ADL evaluation and planning for discharge  - Provide patient education as appropriate  Outcome: Progressing  Goal: Maintains/Returns to pre admission functional level  Description: INTERVENTIONS:  - Perform AM-PAC 6 Click Basic Mobility/ Daily Activity assessment daily.  - Set and communicate daily mobility goal to care team and patient/family/caregiver.   - Collaborate with rehabilitation services on mobility goals if consulted  - Out of bed for toileting  - Record patient progress and toleration of activity level   Outcome: Progressing     Problem: Knowledge Deficit  Goal: Patient/family/caregiver demonstrates understanding of disease process, treatment plan, medications, and discharge instructions  Description: Complete learning assessment and assess knowledge base.  Interventions:  - Provide teaching at level of understanding  - Provide teaching via preferred learning methods  Outcome: Progressing     Problem: DISCHARGE PLANNING  Goal: Discharge to home or other facility with appropriate resources  Description: INTERVENTIONS:  - Identify barriers to discharge w/patient and caregiver  - Arrange for needed  discharge resources and transportation as appropriate  - Identify discharge learning needs (meds, wound care, etc.)  - Arrange for interpretive services to assist at discharge as needed  - Refer to Case Management Department for coordinating discharge planning if the patient needs post-hospital services based on physician/advanced practitioner order or complex needs related to functional status, cognitive ability, or social support system  Outcome: Progressing     Problem: ANTEPARTUM  Goal: Maintain pregnancy as long as maternal and/or fetal condition is stable  Description: INTERVENTIONS:  - Maternal surveillance  - Fetal surveillance  - Monitor uterine activity  - Medications as ordered  - Bedrest  Outcome: Progressing     Problem: BIRTH - VAGINAL/ SECTION  Goal: Fetal and maternal status remain reassuring during the birth process  Description: INTERVENTIONS:  - Monitor vital signs  - Monitor fetal heart rate  - Monitor uterine activity  - Monitor labor progression (vaginal delivery)  - DVT prophylaxis  - Antibiotic prophylaxis  Outcome: Progressing  Goal: Emotionally satisfying birthing experience for mother/fetus  Description: Interventions:  - Assess, plan, implement and evaluate the nursing care given to the patient in labor  - Advocate the philosophy that each childbirth experience is a unique experience and support the family's chosen level of involvement and control during the labor process   - Actively participate in both the patient's and family's teaching of the birth process  - Consider cultural, Baptism and age-specific factors and plan care for the patient in labor  Outcome: Progressing

## 2024-12-28 NOTE — ANESTHESIA POSTPROCEDURE EVALUATION
Post-Op Assessment Note    CV Status:  Stable    Pain management: adequate       Mental Status:  Alert and awake   Hydration Status:  Euvolemic   PONV Controlled:  Controlled   Airway Patency:  Patent     Post Op Vitals Reviewed: Yes    No anethesia notable event occurred.    Staff: CRNA       Last Filed PACU Vitals:  Vitals Value Taken Time   Temp     Pulse     BP     Resp     SpO2         Modified Lupe:  No data recorded

## 2024-12-28 NOTE — OB LABOR/OXYTOCIN SAFETY PROGRESS
Labor Progress Note - Bijal Davis 24 y.o. female MRN: [id number]    Unit/Bed#: -01 Encounter: [id number]    Dose (kayley-units/min) Oxytocin: 0 kayley-units/min (Per Dr. Loomis)  Contraction Frequency (minutes): 4-6  Contraction Intensity: Moderate  Uterine Activity Characteristics: Irregular  Cervical Dilation: 7        Cervical Effacement: 80  Fetal Station: -2  Baseline Rate (FHR): 150 bpm     FHR Category: 1               Vital Signs:   Vitals:    12/27/24 2236   BP: 117/56   Pulse: (!) 110   Resp:    Temp:    SpO2:        Patient due for check. SVE with some change, as above. FHT cat 1, tutu irregularly q4-6. Plan to start pitocin back on at 2 mu/min. D/w Dr. Loomis.    Estefanía Ch MD 12/27/2024 10:57 PM

## 2024-12-28 NOTE — PLAN OF CARE
Problem: PAIN - ADULT  Goal: Verbalizes/displays adequate comfort level or baseline comfort level  Description: Interventions:  - Encourage patient to monitor pain and request assistance  - Assess pain using appropriate pain scale  - Administer analgesics based on type and severity of pain and evaluate response  - Implement non-pharmacological measures as appropriate and evaluate response  - Consider cultural and social influences on pain and pain management  - Notify physician/advanced practitioner if interventions unsuccessful or patient reports new pain  Outcome: Progressing     Problem: INFECTION - ADULT  Goal: Absence or prevention of progression during hospitalization  Description: INTERVENTIONS:  - Assess and monitor for signs and symptoms of infection  - Monitor lab/diagnostic results  - Monitor all insertion sites, i.e. indwelling lines, tubes, and drains  - Monitor endotracheal if appropriate and nasal secretions for changes in amount and color  - [place] appropriate cooling/warming therapies per order  - Administer medications as ordered  - Instruct and encourage patient and family to use good hand hygiene technique  - Identify and instruct in appropriate isolation precautions for identified infection/condition  Outcome: Progressing  Goal: Absence of fever/infection during neutropenic period  Description: INTERVENTIONS:  - Monitor WBC    Outcome: Progressing     Problem: SAFETY ADULT  Goal: Patient will remain free of falls  Description: INTERVENTIONS:  - Educate patient/family on patient safety including physical limitations  - Instruct patient to call for assistance with activity   - Consult OT/PT to assist with strengthening/mobility   - Keep Call bell within reach  - Keep bed low and locked with side rails adjusted as appropriate  - Keep care items and personal belongings within reach  - Initiate and maintain comfort rounds  - Make Fall Risk Sign visible to staff  - Apply yellow socks and bracelet  for high fall risk patients  - Consider moving patient to room near nurses station  Outcome: Progressing  Goal: Maintain or return to baseline ADL function  Description: INTERVENTIONS:  -  Assess patient's ability to carry out ADLs; assess patient's baseline for ADL function and identify physical deficits which impact ability to perform ADLs (bathing, care of mouth/teeth, toileting, grooming, dressing, etc.)  - Assess/evaluate cause of self-care deficits   - Assess range of motion  - Assess patient's mobility; develop plan if impaired  - Assess patient's need for assistive devices and provide as appropriate  - Encourage maximum independence but intervene and supervise when necessary  - Involve family in performance of ADLs  - Assess for home care needs following discharge   - Consider OT consult to assist with ADL evaluation and planning for discharge  - Provide patient education as appropriate  Outcome: Progressing  Goal: Maintains/Returns to pre admission functional level  Description: INTERVENTIONS:  - Perform AM-PAC 6 Click Basic Mobility/ Daily Activity assessment daily.  - Set and communicate daily mobility goal to care team and patient/family/caregiver.   - Collaborate with rehabilitation services on mobility goals if consulted  - Out of bed for toileting  - Record patient progress and toleration of activity level   Outcome: Progressing     Problem: Knowledge Deficit  Goal: Patient/family/caregiver demonstrates understanding of disease process, treatment plan, medications, and discharge instructions  Description: Complete learning assessment and assess knowledge base.  Interventions:  - Provide teaching at level of understanding  - Provide teaching via preferred learning methods  Outcome: Progressing     Problem: DISCHARGE PLANNING  Goal: Discharge to home or other facility with appropriate resources  Description: INTERVENTIONS:  - Identify barriers to discharge w/patient and caregiver  - Arrange for needed  discharge resources and transportation as appropriate  - Identify discharge learning needs (meds, wound care, etc.)  - Arrange for interpretive services to assist at discharge as needed  - Refer to Case Management Department for coordinating discharge planning if the patient needs post-hospital services based on physician/advanced practitioner order or complex needs related to functional status, cognitive ability, or social support system  Outcome: Progressing     Problem: ANTEPARTUM  Goal: Maintain pregnancy as long as maternal and/or fetal condition is stable  Description: INTERVENTIONS:  - Maternal surveillance  - Fetal surveillance  - Monitor uterine activity  - Medications as ordered  - Bedrest  Outcome: Progressing     Problem: BIRTH - VAGINAL/ SECTION  Goal: Fetal and maternal status remain reassuring during the birth process  Description: INTERVENTIONS:  - Monitor vital signs  - Monitor fetal heart rate  - Monitor uterine activity  - Monitor labor progression (vaginal delivery)  - DVT prophylaxis  - Antibiotic prophylaxis  Outcome: Progressing  Goal: Emotionally satisfying birthing experience for mother/fetus  Description: Interventions:  - Assess, plan, implement and evaluate the nursing care given to the patient in labor  - Advocate the philosophy that each childbirth experience is a unique experience and support the family's chosen level of involvement and control during the labor process   - Actively participate in both the patient's and family's teaching of the birth process  - Consider cultural, Protestant and age-specific factors and plan care for the patient in labor  Outcome: Progressing

## 2024-12-28 NOTE — OB LABOR/OXYTOCIN SAFETY PROGRESS
Oxytocin Safety Progress Check Note - Bijal Davis 24 y.o. female MRN: [id number]    Unit/Bed#: -01 Encounter: [id number]    Dose (kayley-units/min) Oxytocin: 0 kayley-units/min (per MD Ch d/t FHT)  Contraction Frequency (minutes): 2  Contraction Intensity: Moderate  Uterine Activity Characteristics: Irregular  Cervical Dilation: 6        Cervical Effacement: 80  Fetal Station: -1  Baseline Rate (FHR): 150 bpm     FHR Category: 2               Vital Signs:   Vitals:    24 0352   BP: 129/86   Pulse: (!) 112   Resp:    Temp: 99.8 °F (37.7 °C)   SpO2:        Notes/comments:   SVE as above, minimal change noted despite patient feeling more pressure. FHT initially improved with amnioinfusion, however, IUPC became displaced. FHT then noted to have recurrent variable and late decelerations, as well as a prolonged 5 minute deceleration. Pitocin was never re-started. Discussed with patient that due to the recurrent, significant decelerations, unresponsive to resuscitative measures and without the influence of pitocin, as well as the lack of cervical change, recommend delivery by primary  section at this time due to a persistent category 2 FHT remote from delivery. Reviewed the risks of  section with patient, including bleeding, infection, injury to surrounding structures, and abnormal placentation in future pregnancies. Patient verbalizes understanding and wishes to proceed. She does not desire any immediate post delivery contraception at the time of delivery. 2g Ancef ordered. Anesthesiologist notified.     Rhonda Loomis MD 2024 4:31 AM

## 2024-12-28 NOTE — ANESTHESIA POSTPROCEDURE EVALUATION
Post-Op Assessment Note    CV Status:  Stable  Pain Score: 2    Pain management: adequate       Mental Status:  Alert and awake   Hydration Status:  Stable   PONV Controlled:  None   Airway Patency:  Patent    No anethesia notable event occurred.    Staff: Anesthesiologist           Last Filed PACU Vitals:  Vitals Value Taken Time   Temp 97.7 °F (36.5 °C) 12/28/24 0550   Pulse 85 12/28/24 0620   /59 12/28/24 0620   Resp 20 12/28/24 0620   SpO2 97 % 12/28/24 0620       Modified Lupe:  Activity: 2 (12/28/2024  6:20 AM)  Respiration: 2 (12/28/2024  6:20 AM)  Circulation: 2 (12/28/2024  6:20 AM)  Consciousness: 2 (12/28/2024  6:20 AM)  Oxygen Saturation: 2 (12/28/2024  6:20 AM)  Modified Lupe Score: 10 (12/28/2024  6:20 AM)

## 2024-12-28 NOTE — OB LABOR/OXYTOCIN SAFETY PROGRESS
Oxytocin Safety Progress Check Note - Bijal Davis 24 y.o. female MRN: [id number]    Unit/Bed#: -01 Encounter: [id number]    Dose (kayley-units/min) Oxytocin: 0 kayley-units/min (per MD Ch d/t FHT)  Contraction Frequency (minutes): 1-3.5  Contraction Intensity: Moderate  Uterine Activity Characteristics: Regular  Cervical Dilation: 6        Cervical Effacement: 80  Fetal Station: -2  Baseline Rate (FHR): 150 bpm     FHR Category: 1               Vital Signs:   Vitals:    24 0030   BP: 126/61   Pulse:    Resp:    Temp:    SpO2:        Notes/comments:   SVE as above. Attempted to re-start pitocin at 2 earlier, but late deceleration to 70 bpm noted, pitocin discontinued. FHT now category 1 and reactive. After continued recovery, will plan to re-start pitocin titration at 1. Discussed with patient that if she does not make cervical change without pitocin, or if pitocin cannot be continued due to FHT decelerations, will need to consider  section at that time.     Rhonda Loomis MD 2024 12:44 AM

## 2024-12-28 NOTE — OB LABOR/OXYTOCIN SAFETY PROGRESS
Oxytocin Safety Progress Check Note - Bijal Davis 24 y.o. female MRN: [id number]    Unit/Bed#: -01 Encounter: [id number]    Dose (kayley-units/min) Oxytocin: 0 kayley-units/min (per MD Ch d/t FHT)  Contraction Frequency (minutes): 2-4.5  Contraction Intensity: Moderate  Uterine Activity Characteristics: Irregular  Cervical Dilation: 6        Cervical Effacement: 80  Fetal Station: -2  Baseline Rate (FHR): 150 bpm     FHR Category: 2               Vital Signs:   Vitals:    24 0223   BP: 120/71   Pulse: 103   Resp:    Temp:    SpO2:        Notes/comments:   SVE as above, unchanged. FHT with intermittent variable decelerations, otherwise moderate variability and accelerations. Will start amnioinfusion. If variable decelerations improve with amnioinfusion, will re-start pitocin titration. If variable decelerations persist, will consider moving towards  delivery.     Rhonda Loomis MD 2024 2:43 AM

## 2024-12-28 NOTE — OB LABOR/OXYTOCIN SAFETY PROGRESS
Labor Progress Note - Bijal Davis 24 y.o. female MRN: [id number]    Unit/Bed#: -01 Encounter: [id number]    Dose (kayley-units/min) Oxytocin: 0 kayley-units/min (per MD Ch d/t FHT)  Contraction Frequency (minutes): difficult to trace  Contraction Intensity: Moderate  Uterine Activity Characteristics: Regular  Cervical Dilation: 6        Cervical Effacement: 80  Fetal Station: -1  Baseline Rate (FHR): 155 bpm     FHR Category: 2               Vital Signs:   Vitals:    12/28/24 0322   BP: 103/56   Pulse: 97   Resp:    Temp:    SpO2:        IUPC not picking up contractions, so readjusted with SVE and confirmed to have been misplaced. SVE largely unchanged, but fetal head now -1. Patient reports feeling immense rectal pressure. Continue to monitor. Plan still to turn pitocin back at 1 mU/min once amnioinfusion bolus finishes.    Estefanía Ch MD 12/28/2024 3:48 AM

## 2024-12-28 NOTE — OP NOTE
OPERATIVE REPORT  PATIENT NAME: Bijal Davis    :  2000  MRN: [id number]  Pt Location: [facility] OR ROOM 01    SURGERY DATE: 2024    Surgeons and Role:     * Rhonda Loomis MD - Primary     * Estefanía Ch MD - Assisting    Preop Diagnosis:  Pregnancy 39w1d  Tobacco use in pregnancy  Insufficient prenatal care    Procedure(s) (LRB):   SECTION () (N/A)    Specimen(s):  ID Type Source Tests Collected by Time Destination   1 : FOR PATHOLOGY Tissue Placenta TISSUE EXAM Rhonda Loomis MD 2024 0509    A :  Cord Blood Cord BLOOD GAS, VENOUS, CORD Rhonda Loomis MD 2024 0509    B :  Cord Blood Cord BLOOD GAS, ARTERIAL, CORD Rhonda Loomis MD 2024 0509        Surgical QBL:  Surgical QBL (mL): 684 mL      Drains:  Urethral Catheter Double-lumen 16 Fr. (Active)   Output (mL) 800 mL 24 2352   Number of days: 1       Anesthesia Type:   Epidural     Operative Indications:  Persistent category 2 FHT remote from delivery     [employer] Classification System:  No Multiple pregnancy, No Transverse or oblique lie, No Breech lie, Gestational age is > or =37 weeks, Multiparous, No Previous uterine scar, Labor induced +  is MOHSEN GROUP 4a    Complications:   None    Procedure and Technique:  Operative Findings:  1. Viable male  at 0508 with APGARs of 8 and 9 at 1 and 5 minutes. Fetus weighted 6lb 13oz (3090g)  2. Normal intact placenta with centrally inserted 3VC expressed at 0511.   3. Normal uterus, bilateral tubes and ovaries.  4. Blood gases:   Arterial pH: 7.248   Arterial base excess: -4.2   Venous pH: 7.357   Venous base excess: -2.6    The patient was taken to the operating room. Epidural anesthesia was adequately established and Ancef and Azithromycin were given for preoperative prophylaxis.  The patient was then placed in the dorsal supine position with a left tilt of the hips. The patient was then prepped with betadine for  chlorhexidine prep and chloraprep for abdominal prep and draped in the usual sterile fashion for a Pfannenstiel skin incision.      A time out was performed to confirm correct patient and correct procedure. An incision was made in the skin with a surgical scalpel and sharp dissection was carried out over subsequent layers of tissue including the fascia, followed by the Bovie electrocautery for hemostasis.  The fascia was incised at the midline and the fascial incision was extended bilaterally in a blunt fashion. Blunt fashion was used to reach the level of the rectus muscles.     The rectus muscles were then divided at midline and the peritoneum was identified, tented up at its upper margin taking care to avoid the bladder, and then entered.  The peritoneal incision was extended superiorly and inferiorly.  The Cristo retractor was inserted and a transverse incision was made in the lower uterine segment using a new surgical blade.  The uterine incision was extended cephalad and caudal using blunt dissection.  The amniotic sac was entered and the amniotic fluid was noted to be clear.    The surgeon's hand was placed into the uterine cavity. The fetal head was identified and elevated through the uterine incision with the assistance of fundal pressure. With gentle traction, the shoulder was delivered, followed by the rest of the fetal body. A nuchal cord and body cord were noted. On delivery the cord was doubly clamped and cut after delayed cord clamping.  The infant was then passed off the table to the awaiting  staff. The  was noted to cry spontaneously and moved all extremities. Venous and arterial blood gas, cord blood, and portion of cord was obtained for analysis and routine blood testing.  The placenta delivery was then sent to pathology. Placenta was noted to be intact with a centrally inserted three-vessel cord.  Oxytocin was administered by IV infusion to enhance uterine contraction. IV TXA was  also administered. The uterus was exteriorized and cleared of all clots and remaining products of conception.      The uterine incision was re approximated using a 0 Vicryl in a running locked fashion.  A second horizontal imbricating stitch with 0 Monocryl was applied.  The uterine incision was examined and noted to be hemostatic.  The posterior cul-de-sac was cleared of all clots and products of conception.  The uterus was replaced into the abdomen and the pericolic gutters were cleared of all clots.  The uterine incision was once again reexamined and noted to be hemostatic.  The fascia was re approximated using 0 Vicryl in a running nonlocked fashion. The subcutaneous tissue was irrigated and cleared of all clots and debris.  Good hemostasis was noted with Bovie electrocautery. The subcutaneous  tissue was reapproximated with running plain suture.  The skin incision was closed using 4-0 Monocryl with Steri Strips placed over top.  Good hemostasis was noted.  Patient tolerated the procedure well.  All needle, sponge, and instrument counts were noted to be correct x 2 at the end of the procedure.  Patient was transferred to the recovery room in stable condition.     I was present for the entire procedure.    Patient Disposition:  PACU         SIGNATURE: Rhonda Loomis MD  DATE: December 28, 2024  TIME: 5:36 AM

## 2024-12-29 PROCEDURE — 99024 POSTOP FOLLOW-UP VISIT: CPT | Performed by: OBSTETRICS & GYNECOLOGY

## 2024-12-29 RX ORDER — IBUPROFEN 600 MG/1
600 TABLET, FILM COATED ORAL EVERY 6 HOURS
Status: DISCONTINUED | OUTPATIENT
Start: 2024-12-29 | End: 2024-12-30 | Stop reason: HOSPADM

## 2024-12-29 RX ORDER — KETOROLAC TROMETHAMINE 30 MG/ML
30 INJECTION, SOLUTION INTRAMUSCULAR; INTRAVENOUS EVERY 6 HOURS SCHEDULED
Status: COMPLETED | OUTPATIENT
Start: 2024-12-29 | End: 2024-12-29

## 2024-12-29 RX ADMIN — IBUPROFEN 600 MG: 600 TABLET, FILM COATED ORAL at 21:20

## 2024-12-29 RX ADMIN — ACETAMINOPHEN 650 MG: 325 TABLET, FILM COATED ORAL at 18:41

## 2024-12-29 RX ADMIN — ACETAMINOPHEN 650 MG: 325 TABLET, FILM COATED ORAL at 06:08

## 2024-12-29 RX ADMIN — KETOROLAC TROMETHAMINE 30 MG: 30 INJECTION, SOLUTION INTRAMUSCULAR; INTRAVENOUS at 02:53

## 2024-12-29 RX ADMIN — ACETAMINOPHEN 650 MG: 325 TABLET, FILM COATED ORAL at 12:40

## 2024-12-29 RX ADMIN — KETOROLAC TROMETHAMINE 30 MG: 30 INJECTION, SOLUTION INTRAMUSCULAR; INTRAVENOUS at 09:03

## 2024-12-29 NOTE — PROGRESS NOTES
"Progress Note - OB/GYN   Name: Bijal Davis 24 y.o. female I MRN: [id number]  Unit/Bed#: -01 I Date of Admission: 2024   Date of Service: 2024 I Hospital Day: 2     Assessment & Plan  S/P  section  QBL: 684mL; Hemoglobin 12.5g/dL --> 10.2g/dL  Pain regimen: venancio Tylenol, venancio Toradol->Motrin; Noelle 5/10 PRN  Continue bowel regimen  Voiding spontaneously, s/p dean  VTE prophylaxis: SCDs (BMI 28)  Encourage ambulation and breastfeeding/pumping  Mild intermittent asthma without complication  No medications  Depression affecting pregnancy in third trimester, antepartum  Not currently on medications  Follow up EPDS  Nicotine use  Patient vapes daily  Declines nicotine patch  Bipolar 1 disorder (HCC)    Preeclampsia  CBC/CMP wnl; p:c ratio 0.7  Systolic (12hrs), Av , Min:111 , Max:122   Diastolic (12hrs), Av, Min:68, Max:84        Disposition    -  Anticipate discharge home on POD# 2-4      Subjective/Objective     Chief Complaint: Postpartum State     Subjective:    Bijal Davis is POD#1 s/p 1LTCS. She has no current complaints.  Pain is well controlled with medications.  Patient is currently voiding and ambulating without difficulty.  Patient is not currently passing flatus, tolerating PO diet, and denies nausea or vomitting. Patient denies fever, chills, chest pain, shortness of breath, or calf tenderness. Lochia is minimal. She is Bottle feeding. She is recovering well and is stable.       Vitals:   /68 (BP Location: Left arm)   Pulse 85   Temp 98 °F (36.7 °C) (Oral)   Resp 12   Ht 5' 3\" (1.6 m)   Wt 73 kg (161 lb)   LMP 2024 (Exact Date)   SpO2 98%   Breastfeeding No   BMI 28.52 kg/m²       Intake/Output Summary (Last 24 hours) at 2024 0616  Last data filed at 2024 2111  Gross per 24 hour   Intake --   Output 2300 ml   Net -2300 ml       Invasive Devices       Peripheral Intravenous Line  Duration             Peripheral IV 24 " Distal;Left;Ventral (anterior) Forearm 1 day                    Physical Exam:   GEN: Bijal Davis appears well, alert and oriented x 3, pleasant and cooperative   CARDIO: RRR, no murmurs or rubs  RESP:  CTAB, no wheezes or rales  ABDOMEN: soft, no tenderness, no distention, fundus firm and @ below umbilicus, Incision C/D/I  EXTREMITIES: SCDs on, non tender, no erythema    Labs:     Hemoglobin   Date Value Ref Range Status   12/28/2024 10.2 (L) 11.5 - 15.4 g/dL Final   12/27/2024 12.5 11.5 - 15.4 g/dL Final   12/30/2015 14.9 11.5 - 15.4 g/dL Final   05/26/2014 13.1 11.0 - 15.0 g/dL Final     WBC   Date Value Ref Range Status   12/28/2024 18.50 (H) 4.31 - 10.16 Thousand/uL Final   12/27/2024 10.88 (H) 4.31 - 10.16 Thousand/uL Final   12/30/2015 20.23 (H) 4.31 - 10.16 Thousand/uL Final   05/26/2014 15.93 (H) 5.00 - 13.00 Thousand/uL Final     Platelets   Date Value Ref Range Status   12/28/2024 204 149 - 390 Thousands/uL Final   12/27/2024 238 149 - 390 Thousands/uL Final   12/30/2015 339 149 - 390 Thousand/uL Final   05/26/2014 204 149 - 390 Thousand/uL Final     Creatinine   Date Value Ref Range Status   12/27/2024 0.63 0.60 - 1.30 mg/dL Final     Comment:     Standardized to IDMS reference method   05/15/2024 0.71 0.60 - 1.30 mg/dL Final     Comment:     Standardized to IDMS reference method   05/28/2023 0.84 0.40 - 1.10 mg/dL Final   02/01/2023 0.84 0.40 - 1.10 mg/dL Final     AST   Date Value Ref Range Status   12/27/2024 14 13 - 39 U/L Final   05/15/2024 12 (L) 13 - 39 U/L Final   05/28/2023 17 <41 U/L Final   02/01/2023 20 <41 U/L Final     ALT   Date Value Ref Range Status   12/27/2024 9 7 - 52 U/L Final     Comment:     Specimen collection should occur prior to Sulfasalazine administration due to the potential for falsely depressed results.    05/15/2024 13 7 - 52 U/L Final     Comment:     Specimen collection should occur prior to Sulfasalazine administration due to the potential for falsely depressed  results.    05/28/2023 18 <56 U/L Final   02/01/2023 28 <56 U/L Final          Juliette Thornton MD  12/29/2024  6:16 AM

## 2024-12-29 NOTE — ASSESSMENT & PLAN NOTE
QBL: 684mL; Hemoglobin 12.5g/dL --> 10.2g/dL  Pain regimen: venancio Tylenol, venancio Toradol->Motrin; Noelle 5/10 PRN  Continue bowel regimen  Voiding spontaneously, s/p dean  VTE prophylaxis: SCDs (BMI 28)  Encourage ambulation and breastfeeding/pumping

## 2024-12-29 NOTE — PLAN OF CARE
Problem: PAIN - ADULT  Goal: Verbalizes/displays adequate comfort level or baseline comfort level  Description: Interventions:  - Encourage patient to monitor pain and request assistance  - Assess pain using appropriate pain scale  - Administer analgesics based on type and severity of pain and evaluate response  - Implement non-pharmacological measures as appropriate and evaluate response  - Consider cultural and social influences on pain and pain management  - Notify physician/advanced practitioner if interventions unsuccessful or patient reports new pain  Outcome: Progressing     Problem: INFECTION - ADULT  Goal: Absence or prevention of progression during hospitalization  Description: INTERVENTIONS:  - Assess and monitor for signs and symptoms of infection  - Monitor lab/diagnostic results  - Monitor all insertion sites, i.e. indwelling lines, tubes, and drains  - Monitor endotracheal if appropriate and nasal secretions for changes in amount and color  - [place] appropriate cooling/warming therapies per order  - Administer medications as ordered  - Instruct and encourage patient and family to use good hand hygiene technique  - Identify and instruct in appropriate isolation precautions for identified infection/condition  Outcome: Progressing  Goal: Absence of fever/infection during neutropenic period  Description: INTERVENTIONS:  - Monitor WBC    Outcome: Progressing     Problem: SAFETY ADULT  Goal: Patient will remain free of falls  Description: INTERVENTIONS:  - Educate patient/family on patient safety including physical limitations  - Instruct patient to call for assistance with activity   - Consult OT/PT to assist with strengthening/mobility   - Keep Call bell within reach  - Keep bed low and locked with side rails adjusted as appropriate  - Keep care items and personal belongings within reach  - Initiate and maintain comfort rounds  - Make Fall Risk Sign visible to staff  - Apply yellow socks and bracelet  for high fall risk patients  - Consider moving patient to room near nurses station  Outcome: Progressing  Goal: Maintain or return to baseline ADL function  Description: INTERVENTIONS:  -  Assess patient's ability to carry out ADLs; assess patient's baseline for ADL function and identify physical deficits which impact ability to perform ADLs (bathing, care of mouth/teeth, toileting, grooming, dressing, etc.)  - Assess/evaluate cause of self-care deficits   - Assess range of motion  - Assess patient's mobility; develop plan if impaired  - Assess patient's need for assistive devices and provide as appropriate  - Encourage maximum independence but intervene and supervise when necessary  - Involve family in performance of ADLs  - Assess for home care needs following discharge   - Consider OT consult to assist with ADL evaluation and planning for discharge  - Provide patient education as appropriate  Outcome: Progressing  Goal: Maintains/Returns to pre admission functional level  Description: INTERVENTIONS:  - Perform AM-PAC 6 Click Basic Mobility/ Daily Activity assessment daily.  - Set and communicate daily mobility goal to care team and patient/family/caregiver.   - Collaborate with rehabilitation services on mobility goals if consulted  - Out of bed for toileting  - Record patient progress and toleration of activity level   Outcome: Progressing     Problem: Knowledge Deficit  Goal: Patient/family/caregiver demonstrates understanding of disease process, treatment plan, medications, and discharge instructions  Description: Complete learning assessment and assess knowledge base.  Interventions:  - Provide teaching at level of understanding  - Provide teaching via preferred learning methods  Outcome: Progressing     Problem: DISCHARGE PLANNING  Goal: Discharge to home or other facility with appropriate resources  Description: INTERVENTIONS:  - Identify barriers to discharge w/patient and caregiver  - Arrange for needed  discharge resources and transportation as appropriate  - Identify discharge learning needs (meds, wound care, etc.)  - Arrange for interpretive services to assist at discharge as needed  - Refer to Case Management Department for coordinating discharge planning if the patient needs post-hospital services based on physician/advanced practitioner order or complex needs related to functional status, cognitive ability, or social support system  Outcome: Progressing     Problem: ANTEPARTUM  Goal: Maintain pregnancy as long as maternal and/or fetal condition is stable  Description: INTERVENTIONS:  - Maternal surveillance  - Fetal surveillance  - Monitor uterine activity  - Medications as ordered  - Bedrest  Outcome: Progressing     Problem: BIRTH - VAGINAL/ SECTION  Goal: Fetal and maternal status remain reassuring during the birth process  Description: INTERVENTIONS:  - Monitor vital signs  - Monitor fetal heart rate  - Monitor uterine activity  - Monitor labor progression (vaginal delivery)  - DVT prophylaxis  - Antibiotic prophylaxis  Outcome: Progressing  Goal: Emotionally satisfying birthing experience for mother/fetus  Description: Interventions:  - Assess, plan, implement and evaluate the nursing care given to the patient in labor  - Advocate the philosophy that each childbirth experience is a unique experience and support the family's chosen level of involvement and control during the labor process   - Actively participate in both the patient's and family's teaching of the birth process  - Consider cultural, Christianity and age-specific factors and plan care for the patient in labor  Outcome: Progressing

## 2024-12-29 NOTE — ASSESSMENT & PLAN NOTE
CBC/CMP wnl; p:c ratio 0.7  Systolic (12hrs), Av , Min:111 , Max:122   Diastolic (12hrs), Av, Min:68, Max:84

## 2024-12-29 NOTE — CASE MANAGEMENT
"   Case Management Progress Note    Patient name Bijal Davis  Location /-01 MRN [id number]  : 2000 Date 2024       LOS (days): 2  Geometric Mean LOS (GMLOS) (days):   Days to GMLOS:        OBJECTIVE:        Current admission status: Inpatient  Preferred Pharmacy:   [facility]/pharmacy #[id number] - [place], [place] - [address]  [address]  [place] [address]  Phone: [phone] Fax: [phone]    Primary Care Provider: Edward Langston DO    Primary Insurance: [employer]  Secondary Insurance:     PROGRESS NOTE:      Consult: CM consulted for \"limited prenatal care\"    CM met w/MOB who provided the following information:      Baby's name/gender: Zach Davis/ Boy    Mother of baby:  Bijal Davis    Father of baby//SO: not involved, no longer in relationship w/ FOB. (MOB is considering a DNA test)          Other Legal Guardian(s) for baby: none    Alternate emergency contact: Grandmother Verena and Uncle John (both added as contacts at request of MOB)    Other children: Yes. 7yo Dtr   Lives with: FOB who has 100% custody.  (different person than Baby's FOB)    Support System: Grounded and reliable. RANULFO's support includes her Grandmother, Uncle and Best friend of several years.     Baby Supplies:  MOB reports she has everything she needs.    Method of feeding: Bottle feeding    Government Assistance Programs/WIC/EBT/SSI:  MOB confirmed she has WIC and SNAP    Work/School:  Currently unemployed and not currently in school.     Transportation: Family provides transport    [facility]    [facility] MOB identified Dr Langston has her physician (Family practice) and she plans to use him as Baby's pediatrician.    Mental Health Hx or treatment: Yes.   MOB reports Dx of Depression, Bipolar, Anxiety.     MOB reports she had stopped all her medications when she found she was pregnant. She is pleased to share that she only experienced mild " "depression during the early part of her pregnancy, otherwise she was fine.     Substance Abuse hx or treatment: No Hx    Hx DV/IPV: No Hx    Legal (probation/parole/incarceration): No Hx    Community Referrals/C&Y/NFP/MCFP:  No Hx    Insurance for baby: [employer]. Currently on MOB's insurance. RANULFO is now aware to contact  Dept of Health to start an MA application for Baby, prior to the end of 30 days from Baby's birth.     CM consulted for \"limited pre-christiano care\" MOB feels that she missed very few appointments. She further informed CM that her primary GYN team is through [facility]. CM completed assessment which MOB participated in  without hesitation. CM observed a supportive family unit, albeit non-traditional. RANULFO lives with her Grandmother and is further supported by her Uncle (RANULFO's late Mother's brother), extended family and a very close friend Soma who she has known for several years.   Family and MOB were unable to identify any other CM needs at this time. CM did inform MOB that [facility] likely has access to mom/baby out patient case management, and encouraged MOB to ask about that level of support. MOB agreed to do so.    No additional concerns or needs at this time. CM consult addressed.         "

## 2024-12-30 VITALS
WEIGHT: 161 LBS | HEIGHT: 63 IN | OXYGEN SATURATION: 98 % | TEMPERATURE: 98.1 F | SYSTOLIC BLOOD PRESSURE: 120 MMHG | DIASTOLIC BLOOD PRESSURE: 73 MMHG | RESPIRATION RATE: 16 BRPM | HEART RATE: 78 BPM | BODY MASS INDEX: 28.53 KG/M2

## 2024-12-30 PROCEDURE — 99024 POSTOP FOLLOW-UP VISIT: CPT | Performed by: OBSTETRICS & GYNECOLOGY

## 2024-12-30 RX ORDER — OXYCODONE HYDROCHLORIDE 5 MG/1
5 TABLET ORAL EVERY 4 HOURS PRN
Qty: 5 TABLET | Refills: 0 | Status: CANCELLED | OUTPATIENT
Start: 2024-12-30 | End: 2025-01-09

## 2024-12-30 RX ORDER — IBUPROFEN 600 MG/1
600 TABLET, FILM COATED ORAL EVERY 6 HOURS
Qty: 30 TABLET | Refills: 0 | Status: SHIPPED | OUTPATIENT
Start: 2024-12-30

## 2024-12-30 RX ORDER — ACETAMINOPHEN 325 MG/1
650 TABLET ORAL EVERY 6 HOURS SCHEDULED
Start: 2024-12-30 | End: 2025-01-01

## 2024-12-30 RX ORDER — OXYCODONE HYDROCHLORIDE 10 MG/1
10 TABLET ORAL EVERY 4 HOURS PRN
Qty: 5 TABLET | Refills: 0 | Status: CANCELLED | OUTPATIENT
Start: 2024-12-30 | End: 2025-01-09

## 2024-12-30 RX ADMIN — IBUPROFEN 600 MG: 600 TABLET, FILM COATED ORAL at 10:36

## 2024-12-30 RX ADMIN — IBUPROFEN 600 MG: 600 TABLET, FILM COATED ORAL at 05:14

## 2024-12-30 NOTE — PROGRESS NOTES
Progress Note - OB/GYN   Name: Bijal Davis 24 y.o. female I MRN: [id number]  Unit/Bed#: -01 I Date of Admission: 2024   Date of Service: 2024 I Hospital Day: 3     Assessment & Plan  S/P  section  QBL: 684mL; Hemoglobin 12.5g/dL --> 10.2g/dL  Pain regimen: venancio Tylenol, venancio Toradol->Motrin; Noelle 5/10 PRN  Continue bowel regimen  Voiding spontaneously, s/p dean  VTE prophylaxis: SCDs (BMI 28)  Encourage ambulation and breastfeeding/pumping  Mild intermittent asthma without complication  No medications  Depression affecting pregnancy in third trimester, antepartum  Not currently on medications  Follow up EPDS  Nicotine use  Patient vapes daily  Declines nicotine patch  Bipolar 1 disorder (HCC)    Preeclampsia  CBC/CMP wnl; p:c ratio 0.7  Systolic (12hrs), Av , Min:106 , Max:127   Diastolic (12hrs), Av, Min:72, Max:77    Pregnancy complicated by tobacco use, third trimester    Insufficient prenatal care in third trimester      OB Post-Partum Progress Note  Subjective   Post delivery. Patient is doing well. Lochia WNL. Pain well controlled.     Pain: yes, cramping, improved with meds  Tolerating PO: yes  Voiding: yes  Flatus: yes  BM: no  Ambulating: yes  Breastfeeding:  yes  Chest pain: no  Shortness of breath: no  Leg pain: no  Lochia: WNL    Objective :  Temp:  [97.5 °F (36.4 °C)-98 °F (36.7 °C)] 98 °F (36.7 °C)  HR:  [75-91] 75  BP: (106-136)/(69-83) 116/72  Resp:  [16-17] 17  SpO2:  [98 %-100 %] 99 %  O2 Device: None (Room air)    Physical Exam  Vitals and nursing note reviewed.   Constitutional:       General: She is not in acute distress.     Appearance: She is well-developed.   HENT:      Head: Normocephalic and atraumatic.   Eyes:      Conjunctiva/sclera: Conjunctivae normal.   Cardiovascular:      Rate and Rhythm: Normal rate and regular rhythm.      Heart sounds: No murmur heard.  Pulmonary:      Effort: Pulmonary effort is normal. No respiratory distress.      Breath  sounds: Normal breath sounds.   Abdominal:      Palpations: Abdomen is soft.      Tenderness: There is no abdominal tenderness.      Comments: Fundus firm below umbilicus   Musculoskeletal:         General: No swelling.      Cervical back: Neck supple.   Skin:     General: Skin is warm and dry.      Capillary Refill: Capillary refill takes less than 2 seconds.   Neurological:      Mental Status: She is alert.   Psychiatric:         Mood and Affect: Mood normal.           Lab Results: I have reviewed the following results:  Lab Results   Component Value Date    WBC 18.50 (H) 12/28/2024    HGB 10.2 (L) 12/28/2024    HCT 30.8 (L) 12/28/2024    MCV 87 12/28/2024     12/28/2024     Sugey Pressley  PGY-1 OB/GYN  12/30/24  6:38 AM

## 2024-12-30 NOTE — PLAN OF CARE
Problem: PAIN - ADULT  Goal: Verbalizes/displays adequate comfort level or baseline comfort level  Description: Interventions:  - Encourage patient to monitor pain and request assistance  - Assess pain using appropriate pain scale  - Administer analgesics based on type and severity of pain and evaluate response  - Implement non-pharmacological measures as appropriate and evaluate response  - Consider cultural and social influences on pain and pain management  - Notify physician/advanced practitioner if interventions unsuccessful or patient reports new pain  Outcome: Progressing     Problem: INFECTION - ADULT  Goal: Absence or prevention of progression during hospitalization  Description: INTERVENTIONS:  - Assess and monitor for signs and symptoms of infection  - Monitor lab/diagnostic results  - Monitor all insertion sites, i.e. indwelling lines, tubes, and drains  - Monitor endotracheal if appropriate and nasal secretions for changes in amount and color  - [place] appropriate cooling/warming therapies per order  - Administer medications as ordered  - Instruct and encourage patient and family to use good hand hygiene technique  - Identify and instruct in appropriate isolation precautions for identified infection/condition  Outcome: Progressing  Goal: Absence of fever/infection during neutropenic period  Description: INTERVENTIONS:  - Monitor WBC    Outcome: Progressing     Problem: SAFETY ADULT  Goal: Patient will remain free of falls  Description: INTERVENTIONS:  - Educate patient/family on patient safety including physical limitations  - Instruct patient to call for assistance with activity   - Consult OT/PT to assist with strengthening/mobility   - Keep Call bell within reach  - Keep bed low and locked with side rails adjusted as appropriate  - Keep care items and personal belongings within reach  - Initiate and maintain comfort rounds  - Make Fall Risk Sign visible to staff  - Apply yellow socks and bracelet  for high fall risk patients  - Consider moving patient to room near nurses station  Outcome: Progressing  Goal: Maintain or return to baseline ADL function  Description: INTERVENTIONS:  -  Assess patient's ability to carry out ADLs; assess patient's baseline for ADL function and identify physical deficits which impact ability to perform ADLs (bathing, care of mouth/teeth, toileting, grooming, dressing, etc.)  - Assess/evaluate cause of self-care deficits   - Assess range of motion  - Assess patient's mobility; develop plan if impaired  - Assess patient's need for assistive devices and provide as appropriate  - Encourage maximum independence but intervene and supervise when necessary  - Involve family in performance of ADLs  - Assess for home care needs following discharge   - Consider OT consult to assist with ADL evaluation and planning for discharge  - Provide patient education as appropriate  Outcome: Progressing  Goal: Maintains/Returns to pre admission functional level  Description: INTERVENTIONS:  - Perform AM-PAC 6 Click Basic Mobility/ Daily Activity assessment daily.  - Set and communicate daily mobility goal to care team and patient/family/caregiver.   - Collaborate with rehabilitation services on mobility goals if consulted  - Out of bed for toileting  - Record patient progress and toleration of activity level   Outcome: Progressing     Problem: Knowledge Deficit  Goal: Patient/family/caregiver demonstrates understanding of disease process, treatment plan, medications, and discharge instructions  Description: Complete learning assessment and assess knowledge base.  Interventions:  - Provide teaching at level of understanding  - Provide teaching via preferred learning methods  Outcome: Progressing     Problem: DISCHARGE PLANNING  Goal: Discharge to home or other facility with appropriate resources  Description: INTERVENTIONS:  - Identify barriers to discharge w/patient and caregiver  - Arrange for needed  discharge resources and transportation as appropriate  - Identify discharge learning needs (meds, wound care, etc.)  - Arrange for interpretive services to assist at discharge as needed  - Refer to Case Management Department for coordinating discharge planning if the patient needs post-hospital services based on physician/advanced practitioner order or complex needs related to functional status, cognitive ability, or social support system  Outcome: Progressing     Problem: POSTPARTUM  Goal: Experiences normal postpartum course  Description: INTERVENTIONS:  - Monitor maternal vital signs  - Assess uterine involution and lochia  Outcome: Progressing  Goal: Appropriate maternal -  bonding  Description: INTERVENTIONS:  - Identify family support  - Assess for appropriate maternal/infant bonding   -Encourage maternal/infant bonding opportunities  - Referral to  or  as needed  Outcome: Progressing  Goal: Establishment of infant feeding pattern  Description: INTERVENTIONS:  - Assess breast/bottle feeding  - Refer to lactation as needed  Outcome: Progressing  Goal: Incision(s), wounds(s) or drain site(s) healing without S/S of infection  Description: INTERVENTIONS  - Assess and document dressing, incision, wound bed, drain sites and surrounding tissue  - Provide patient and family education  Outcome: Progressing

## 2024-12-30 NOTE — ASSESSMENT & PLAN NOTE
CBC/CMP wnl; p:c ratio 0.7  Systolic (12hrs), Av , Min:106 , Max:127   Diastolic (12hrs), Av, Min:72, Max:77

## 2024-12-30 NOTE — PROGRESS NOTES
Discharge teaching completed for mom and baby.  Save your life magnet given and explained. Appropriate questions asked and answered.  Educated parent on car seat safety, shaken baby and safe sleep.  Mom verbalized understand.

## 2024-12-30 NOTE — PLAN OF CARE
Problem: PAIN - ADULT  Goal: Verbalizes/displays adequate comfort level or baseline comfort level  Description: Interventions:  - Encourage patient to monitor pain and request assistance  - Assess pain using appropriate pain scale  - Administer analgesics based on type and severity of pain and evaluate response  - Implement non-pharmacological measures as appropriate and evaluate response  - Consider cultural and social influences on pain and pain management  - Notify physician/advanced practitioner if interventions unsuccessful or patient reports new pain  Outcome: Progressing     Problem: INFECTION - ADULT  Goal: Absence or prevention of progression during hospitalization  Description: INTERVENTIONS:  - Assess and monitor for signs and symptoms of infection  - Monitor lab/diagnostic results  - Monitor all insertion sites, i.e. indwelling lines, tubes, and drains  - Monitor endotracheal if appropriate and nasal secretions for changes in amount and color  - [place] appropriate cooling/warming therapies per order  - Administer medications as ordered  - Instruct and encourage patient and family to use good hand hygiene technique  - Identify and instruct in appropriate isolation precautions for identified infection/condition  Outcome: Progressing  Goal: Absence of fever/infection during neutropenic period  Description: INTERVENTIONS:  - Monitor WBC    Outcome: Progressing     Problem: SAFETY ADULT  Goal: Patient will remain free of falls  Description: INTERVENTIONS:  - Educate patient/family on patient safety including physical limitations  - Instruct patient to call for assistance with activity   - Consult OT/PT to assist with strengthening/mobility   - Keep Call bell within reach  - Keep bed low and locked with side rails adjusted as appropriate  - Keep care items and personal belongings within reach  - Initiate and maintain comfort rounds  - Make Fall Risk Sign visible to staff  - - Apply yellow socks and  bracelet for high fall risk patients  - Consider moving patient to room near nurses station  Outcome: Progressing  Goal: Maintain or return to baseline ADL function  Description: INTERVENTIONS:  -  Assess patient's ability to carry out ADLs; assess patient's baseline for ADL function and identify physical deficits which impact ability to perform ADLs (bathing, care of mouth/teeth, toileting, grooming, dressing, etc.)  - Assess/evaluate cause of self-care deficits   - Assess range of motion  - Assess patient's mobility; develop plan if impaired  - Assess patient's need for assistive devices and provide as appropriate  - Encourage maximum independence but intervene and supervise when necessary  - Involve family in performance of ADLs  - Assess for home care needs following discharge   - Consider OT consult to assist with ADL evaluation and planning for discharge  - Provide patient education as appropriate  Outcome: Progressing  Goal: Maintains/Returns to pre admission functional level  Description: INTERVENTIONS:  - Perform AM-PAC 6 Click Basic Mobility/ Daily Activity assessment daily.  - Set and communicate daily mobility goal to care team and patient/family/caregiver.   - Collaborate with rehabilitation services on mobility goals if consulted  - - Out of bed for toileting  - Record patient progress and toleration of activity level   Outcome: Progressing     Problem: Knowledge Deficit  Goal: Patient/family/caregiver demonstrates understanding of disease process, treatment plan, medications, and discharge instructions  Description: Complete learning assessment and assess knowledge base.  Interventions:  - Provide teaching at level of understanding  - Provide teaching via preferred learning methods  Outcome: Progressing     Problem: DISCHARGE PLANNING  Goal: Discharge to home or other facility with appropriate resources  Description: INTERVENTIONS:  - Identify barriers to discharge w/patient and caregiver  - Arrange  for needed discharge resources and transportation as appropriate  - Identify discharge learning needs (meds, wound care, etc.)  - Arrange for interpretive services to assist at discharge as needed  - Refer to Case Management Department for coordinating discharge planning if the patient needs post-hospital services based on physician/advanced practitioner order or complex needs related to functional status, cognitive ability, or social support system  Outcome: Progressing     Problem: POSTPARTUM  Goal: Experiences normal postpartum course  Description: INTERVENTIONS:  - Monitor maternal vital signs  - Assess uterine involution and lochia  Outcome: Progressing  Goal: Appropriate maternal -  bonding  Description: INTERVENTIONS:  - Identify family support  - Assess for appropriate maternal/infant bonding   -Encourage maternal/infant bonding opportunities  - Referral to  or  as needed  Outcome: Progressing  Goal: Establishment of infant feeding pattern  Description: INTERVENTIONS:  - Assess breast/bottle feeding  - Refer to lactation as needed  Outcome: Progressing  Goal: Incision(s), wounds(s) or drain site(s) healing without S/S of infection  Description: INTERVENTIONS  - Assess and document dressing, incision, wound bed, drain sites and surrounding tissue  - Provide patient and family education  -Outcome: Progressing

## 2024-12-30 NOTE — PLAN OF CARE
Problem: PAIN - ADULT  Goal: Verbalizes/displays adequate comfort level or baseline comfort level  Description: Interventions:  - Encourage patient to monitor pain and request assistance  - Assess pain using appropriate pain scale  - Administer analgesics based on type and severity of pain and evaluate response  - Implement non-pharmacological measures as appropriate and evaluate response  - Consider cultural and social influences on pain and pain management  - Notify physician/advanced practitioner if interventions unsuccessful or patient reports new pain  12/30/2024 0951 by Nohelia Arriola RN  Outcome: Completed  12/30/2024 0831 by Nohelia Arriola RN  Outcome: Progressing     Problem: INFECTION - ADULT  Goal: Absence or prevention of progression during hospitalization  Description: INTERVENTIONS:  - Assess and monitor for signs and symptoms of infection  - Monitor lab/diagnostic results  - Monitor all insertion sites, i.e. indwelling lines, tubes, and drains  - Monitor endotracheal if appropriate and nasal secretions for changes in amount and color  - [place] appropriate cooling/warming therapies per order  - Administer medications as ordered  - Instruct and encourage patient and family to use good hand hygiene technique  - Identify and instruct in appropriate isolation precautions for identified infection/condition  12/30/2024 0951 by Nohelia Arriola RN  Outcome: Completed  12/30/2024 0831 by Nohelia Arriola RN  Outcome: Progressing  Goal: Absence of fever/infection during neutropenic period  Description: INTERVENTIONS:  - Monitor WBC    12/30/2024 0951 by Nohelia Arriola RN  Outcome: Completed  12/30/2024 0831 by Nohelia Arriola RN  Outcome: Progressing     Problem: SAFETY ADULT  Goal: Patient will remain free of falls  Description: INTERVENTIONS:  - Educate patient/family on patient safety including physical limitations  - Instruct patient to call for assistance with activity   - Consult OT/PT  to assist with strengthening/mobility   - Keep Call bell within reach  - Keep bed low and locked with side rails adjusted as appropriate  - Keep care items and personal belongings within reach  - Initiate and maintain comfort rounds  - Make Fall Risk Sign visible to staff  -- Apply yellow socks and bracelet for high fall risk patients  - Consider moving patient to room near nurses station  12/30/2024 0951 by Nohelia Arriola RN  Outcome: Completed  12/30/2024 0831 by Nohelia Arriola RN  Outcome: Progressing  Goal: Maintain or return to baseline ADL function  Description: INTERVENTIONS:  -  Assess patient's ability to carry out ADLs; assess patient's baseline for ADL function and identify physical deficits which impact ability to perform ADLs (bathing, care of mouth/teeth, toileting, grooming, dressing, etc.)  - Assess/evaluate cause of self-care deficits   - Assess range of motion  - Assess patient's mobility; develop plan if impaired  - Assess patient's need for assistive devices and provide as appropriate  - Encourage maximum independence but intervene and supervise when necessary  - Involve family in performance of ADLs  - Assess for home care needs following discharge   - Consider OT consult to assist with ADL evaluation and planning for discharge  - Provide patient education as appropriate  12/30/2024 0951 by Nohelia Arriola RN  Outcome: Completed  12/30/2024 0831 by Nohelia Arriola RN  Outcome: Progressing  Goal: Maintains/Returns to pre admission functional level  Description: INTERVENTIONS:  - Perform AM-PAC 6 Click Basic Mobility/ Daily Activity assessment daily.  - Set and communicate daily mobility goal to care team and patient/family/caregiver.   - Collaborate with rehabilitation services on mobility goals if consulted  -- Out of bed for toileting  - Record patient progress and toleration of activity level   12/30/2024 0951 by oNhelia Arriola RN  Outcome: Completed  12/30/2024 0831 by Nohelia MORGAN  HILARY Arriola  Outcome: Progressing     Problem: Knowledge Deficit  Goal: Patient/family/caregiver demonstrates understanding of disease process, treatment plan, medications, and discharge instructions  Description: Complete learning assessment and assess knowledge base.  Interventions:  - Provide teaching at level of understanding  - Provide teaching via preferred learning methods  2024 by Nohelia Arriola RN  Outcome: Completed  2024 by Nohelia Arriola RN  Outcome: Progressing     Problem: DISCHARGE PLANNING  Goal: Discharge to home or other facility with appropriate resources  Description: INTERVENTIONS:  - Identify barriers to discharge w/patient and caregiver  - Arrange for needed discharge resources and transportation as appropriate  - Identify discharge learning needs (meds, wound care, etc.)  - Arrange for interpretive services to assist at discharge as needed  - Refer to Case Management Department for coordinating discharge planning if the patient needs post-hospital services based on physician/advanced practitioner order or complex needs related to functional status, cognitive ability, or social support system  2024 by Nohelia Arriola RN  Outcome: Completed  2024 by Nohelia Arriola RN  Outcome: Progressing     Problem: POSTPARTUM  Goal: Experiences normal postpartum course  Description: INTERVENTIONS:  - Monitor maternal vital signs  - Assess uterine involution and lochia  2024 by Nohelia Arriola RN  Outcome: Completed  2024 by Nohelia Arriola RN  Outcome: Progressing  Goal: Appropriate maternal -  bonding  Description: INTERVENTIONS:  - Identify family support  - Assess for appropriate maternal/infant bonding   -Encourage maternal/infant bonding opportunities  - Referral to  or  as needed  2024 by Nohelia Arriola RN  Outcome: Completed  2024 by Nohelia Arriola RN  Outcome:  Progressing  Goal: Establishment of infant feeding pattern  Description: INTERVENTIONS:  - Assess breast/bottle feeding  - Refer to lactation as needed  12/30/2024 0951 by Nohelia Arriola RN  Outcome: Completed  12/30/2024 0831 by Nohelia Arriola RN  Outcome: Progressing  Goal: Incision(s), wounds(s) or drain site(s) healing without S/S of infection  Description: INTERVENTIONS  - Assess and document dressing, incision, wound bed, drain sites and surrounding tissue  - Provide patient and family education  12/30/2024 0951 by Nohelia Arriola RN  Outcome: Completed  12/30/2024 0831 by Nohelia Arriola RN  Outcome: Progressing

## 2024-12-30 NOTE — DISCHARGE INSTR - AVS FIRST PAGE
Blood pressure    Normal Blood pressure is 120's/70'-80's  Elevated: 140's/90's  Call your doctor if the top number is running in the 150's or the bottom number is running in the 100's  Call your doctor immediately if the top number is in the 160's or the bottom number is 110 or greater.

## 2024-12-31 PROCEDURE — 88307 TISSUE EXAM BY PATHOLOGIST: CPT | Performed by: PATHOLOGY

## 2024-12-31 NOTE — UTILIZATION REVIEW
MOTHER AND BABY DISCHARGED DEC 30    Notification of Unplanned, Urgent, or   Emergency Inpatient Admission   AUTHORIZATION REQUEST   Admitting Facility [place] [address]  Tax ID: [id number]  NPI: [id number]  Place of Service: [facility]  Admission Level of Care: Inpatient  Place of Service Code: 21     Attending Physician Information  Attending Name and NPI#: Rhonda Loomis Md [[id number]]  Phone: [phone]     Admission Information  Inpatient Admission Date/Time: 12/27/24  5:48 AM  Discharge Date/Time: 12/30/2024 12:08 PM  Admitting Diagnosis Code/Description:  Encounter for elective induction of labor [Z34.90]     Utilization Review Contact  Kimber Quinones Utilization   Phone: [phone]  Fax: [phone]  Email: [email or web address]  Contact for approvals/pending authorizations, clinical reviews, and discharge.     Physician Advisory Services Contact  Medical Necessity Denial & [place] Discussion  Phone: [phone]  Fax: [phone]  Email: [email or web address]     DISCHARGE SUPPORT TEAM:  For Patients Discharge Needs & Updates  Phone: [phone] opt. 2 Fax: [phone]  Email: [email or web address]     NOTIFICATION OF ADMISSION DISCHARGE   This is a Notification of Discharge from [facility]. Please be advised that this patient has been discharge from our facility. Below you will find the admission and discharge date and time including the patient’s disposition.   UTILIZATION REVIEW CONTACT:  Kim Naqvi  Utilization   Network Utilization Review Department  Phone: [phone] x carefully listen to the prompts. All voicemails are confidential.  Email: [email or web address]     ADMISSION INFORMATION  PRESENTATION DATE: 12/27/2024  5:34 AM  OBERVATION ADMISSION DATE: N/A  INPATIENT ADMISSION DATE: 12/27/24   5:48 AM   DISCHARGE DATE: 12/30/2024 12:08 PM   DISPOSITION:Home/Self Care    Network Utilization Review Department  ATTENTION: Please call with any questions or concerns to [phone] and carefully listen to the prompts so that you are directed to the right person. All voicemails are confidential.   For Discharge needs, contact Care Management DC Support Team at [phone] opt. 2  Send all requests for admission clinical reviews, approved or denied determinations and any other requests to dedicated fax number below belonging to the campus where the patient is receiving treatment. List of dedicated fax numbers for the Facilities:  FACILITY NAME UR FAX NUMBER   ADMISSION DENIALS (Administrative/Medical Necessity) [phone]   DISCHARGE SUPPORT TEAM (NETWORK) [phone]   PARENT CHILD HEALTH (Maternity/NICU/Pediatrics) [phone]   [facility] [phone]   [facility] [phone]   [facility] [phone]   [facility] [phone]   Critical access hospital [phone]   [facility] [phone]   [facility] [phone]   [facility] [phone]   [facility] [phone]   [facility] [phone]   [facility] [phone]   [facility] [phone]

## 2025-01-02 ENCOUNTER — TELEPHONE (OUTPATIENT)
Dept: OBGYN CLINIC | Facility: CLINIC | Age: 25
End: 2025-01-02

## 2025-06-27 ENCOUNTER — HOSPITAL ENCOUNTER (EMERGENCY)
Facility: HOSPITAL | Age: 25
Discharge: HOME | End: 2025-06-27
Attending: EMERGENCY MEDICINE
Payer: COMMERCIAL

## 2025-06-27 VITALS
RESPIRATION RATE: 16 BRPM | WEIGHT: 159.17 LBS | HEART RATE: 79 BPM | BODY MASS INDEX: 28.2 KG/M2 | OXYGEN SATURATION: 100 % | TEMPERATURE: 98.4 F | DIASTOLIC BLOOD PRESSURE: 76 MMHG | SYSTOLIC BLOOD PRESSURE: 122 MMHG

## 2025-06-27 DIAGNOSIS — K08.89 PAIN, DENTAL: Primary | ICD-10-CM

## 2025-06-27 PROCEDURE — 99282 EMERGENCY DEPT VISIT SF MDM: CPT

## 2025-06-27 PROCEDURE — 99284 EMERGENCY DEPT VISIT MOD MDM: CPT | Performed by: EMERGENCY MEDICINE

## 2025-06-27 RX ORDER — PENICILLIN V POTASSIUM 250 MG/1
500 TABLET, FILM COATED ORAL ONCE
Status: COMPLETED | OUTPATIENT
Start: 2025-06-27 | End: 2025-06-27

## 2025-06-27 RX ORDER — ACETAMINOPHEN 500 MG
1000 TABLET ORAL EVERY 6 HOURS PRN
Qty: 24 TABLET | Refills: 0 | Status: SHIPPED | OUTPATIENT
Start: 2025-06-27

## 2025-06-27 RX ORDER — PENICILLIN V POTASSIUM 500 MG/1
500 TABLET, FILM COATED ORAL 4 TIMES DAILY
Qty: 28 TABLET | Refills: 0 | Status: SHIPPED | OUTPATIENT
Start: 2025-06-27 | End: 2025-07-04

## 2025-06-27 RX ADMIN — PENICILLIN V POTASSIUM 500 MG: 250 TABLET, FILM COATED ORAL at 12:57

## 2025-06-27 NOTE — Clinical Note
Bijal Davis was seen and treated in our emergency department on 6/27/2025.                Diagnosis:     Bijal  .    She may return on this date: 06/29/2025         If you have any questions or concerns, please don't hesitate to call.      Christiano Salmon MD    ______________________________           _______________          _______________  Hospital Representative                              Date                                Time

## 2025-06-27 NOTE — ED PROVIDER NOTES
"Time reflects when diagnosis was documented in both MDM as applicable and the Disposition within this note       Time User Action Codes Description Comment    6/27/2025 12:51 PM Christiano Salmon Add [K08.89] Pain, dental           ED Disposition       ED Disposition   Discharge    Condition   Stable    Date/Time   Fri Jun 27, 2025 12:51 PM    Comment   Bijal Davis discharge to home/self care.                   Assessment & Plan       Medical Decision Making  Dental pain no obvious signs of an abscess cover with antibiotics for possible early infection patient needs to continue Tylenol as she is pregnant she cannot take Motrin follow-up with a dentist airway compromise no Ludewig's    Risk  OTC drugs.  Prescription drug management.             Medications   penicillin V potassium (VEETID) tablet 500 mg (has no administration in time range)       ED Risk Strat Scores                    No data recorded                            History of Present Illness       Chief Complaint   Patient presents with    Dental Pain     States that she has had right sided dental pain for a long time. Most recent episode started about 1 week ago. States she has an appointment on 7/15 with dentist. States that she is 6 weeks pregnant       Past Medical History[1]   Past Surgical History[2]   Family History[3]   Social History[4]   E-Cigarette/Vaping    E-Cigarette Use Current Every Day User     Comments \"uses all day long\"       E-Cigarette/Vaping Substances    Nicotine Yes     THC No     CBD No     Flavoring Yes     Other No     Unknown No       I have reviewed and agree with the history as documented.     Patient is complaining of pain to all her wisdom teeth for over a week she has a dental appointment July 15 been taking Tylenol without relief denies any fever difficulty breathing or swallowing she is also approximately 6 weeks pregnant denies abdominal pain or vaginal bleeding      Dental Pain  Associated symptoms: no fever and " no neck pain        Review of Systems   Constitutional:  Negative for chills and fever.   HENT:  Negative for ear pain and sore throat.    Eyes:  Negative for redness.   Respiratory:  Negative for shortness of breath.    Cardiovascular:  Negative for chest pain.   Gastrointestinal:  Negative for abdominal pain and vomiting.   Genitourinary:  Negative for vaginal bleeding.   Musculoskeletal:  Negative for neck pain.   Skin:  Negative for color change and rash.   Neurological:  Negative for seizures and syncope.   All other systems reviewed and are negative.          Objective       ED Triage Vitals [06/27/25 1247]   Temperature Pulse Blood Pressure Respirations SpO2 Patient Position - Orthostatic VS   98.4 °F (36.9 °C) 79 122/76 16 100 % Sitting      Temp Source Heart Rate Source BP Location FiO2 (%) Pain Score    Tympanic Monitor Left arm -- --      Vitals      Date and Time Temp Pulse SpO2 Resp BP Pain Score FACES Pain Rating User   06/27/25 1247 98.4 °F (36.9 °C) 79 100 % 16 122/76 -- -- MB            Physical Exam  Vitals and nursing note reviewed.   Constitutional:       General: She is not in acute distress.     Appearance: She is well-developed. She is not ill-appearing, toxic-appearing or diaphoretic.   HENT:      Head: Normocephalic and atraumatic.      Mouth/Throat:      Mouth: Mucous membranes are moist.      Pharynx: No oropharyngeal exudate or posterior oropharyngeal erythema.      Comments: Patient's exams sensory unremarkable did have some mild tenderness to palpation in the right upper third molar no swelling or drainage    Eyes:      Conjunctiva/sclera: Conjunctivae normal.       Cardiovascular:      Rate and Rhythm: Normal rate and regular rhythm.      Heart sounds: No murmur heard.  Pulmonary:      Effort: Pulmonary effort is normal. No respiratory distress.      Breath sounds: Normal breath sounds.   Abdominal:      Palpations: Abdomen is soft.      Tenderness: There is no abdominal tenderness.      Musculoskeletal:         General: No swelling.      Cervical back: Neck supple.     Skin:     General: Skin is warm and dry.      Capillary Refill: Capillary refill takes less than 2 seconds.     Neurological:      General: No focal deficit present.      Mental Status: She is alert.      Cranial Nerves: No cranial nerve deficit.     Psychiatric:         Mood and Affect: Mood normal.         Results Reviewed       None            No orders to display       Procedures    ED Medication and Procedure Management   Prior to Admission Medications   Prescriptions Last Dose Informant Patient Reported? Taking?   Prenatal Vit-Fe Fumarate-FA (Prenatal Complete) 14-0.4 MG TABS   No No   Sig: Take 1 tablet by mouth daily   buPROPion (WELLBUTRIN XL) 300 mg 24 hr tablet   Yes No   Sig: Take 300 mg by mouth every morning   Patient not taking: Reported on 10/31/2024   escitalopram (LEXAPRO) 10 mg tablet   Yes No   Sig: Take 10 mg by mouth daily   Patient not taking: Reported on 10/31/2024   ibuprofen (MOTRIN) 600 mg tablet   No No   Sig: Take 1 tablet (600 mg total) by mouth every 6 (six) hours      Facility-Administered Medications: None     Patient's Medications   Discharge Prescriptions    ACETAMINOPHEN (TYLENOL) 500 MG TABLET    Take 2 tablets (1,000 mg total) by mouth every 6 (six) hours as needed for mild pain       Start Date: 6/27/2025 End Date: --       Order Dose: 1,000 mg       Quantity: 24 tablet    Refills: 0    PENICILLIN V POTASSIUM (VEETID) 500 MG TABLET    Take 1 tablet (500 mg total) by mouth 4 (four) times a day for 7 days       Start Date: 6/27/2025 End Date: 7/4/2025       Order Dose: 500 mg       Quantity: 28 tablet    Refills: 0     No discharge procedures on file.  ED SEPSIS DOCUMENTATION   Time reflects when diagnosis was documented in both MDM as applicable and the Disposition within this note       Time User Action Codes Description Comment    6/27/2025 12:51 PM Christiano Salmon Add [K08.89] Pain, dental                     [1]   Past Medical History:  Diagnosis Date    Anxiety     Asthma     Bipolar 1 disorder (HCC)     Depression     Varicella    [2]   Past Surgical History:  Procedure Laterality Date    NO PAST SURGERIES      [place]  DELIVERY ONLY N/A 2024    Procedure:  SECTION ();  Surgeon: Rhonda Loomis MD;  Location: [facility];  Service: Obstetrics   [3]   Family History  Problem Relation Name Age of Onset    Cirrhosis Mother      No Known Problems Sister twin     Asthma Brother      Drug abuse Brother      No Known Problems Daughter      Diabetes Maternal Grandmother      Heart disease Maternal Grandmother      Kidney disease Maternal Grandmother      Cancer Maternal Grandfather     [4]   Social History  Tobacco Use    Smoking status: Never    Smokeless tobacco: Never   Vaping Use    Vaping status: Every Day    Substances: Nicotine, Flavoring   Substance Use Topics    Alcohol use: Not Currently    Drug use: No        Christiano Salmon MD  25 1031

## (undated) DEVICE — CHLORAPREP HI-LITE 26ML ORANGE

## (undated) DEVICE — KIT,BETHLEHEM C SECT DELIVERY: Brand: CARDINAL HEALTH

## (undated) DEVICE — SUT VICRYL 0 CTX 36 IN J978H

## (undated) DEVICE — GLOVE INDICATOR PI UNDERGLOVE SZ 7 BLUE

## (undated) DEVICE — GLOVE SRG BIOGEL ECLIPSE 6.5

## (undated) DEVICE — TELFA NON-ADHERENT ABSORBENT DRESSING: Brand: TELFA

## (undated) DEVICE — SUT MONOCRYL 4-0 PS-2 27 IN Y426H

## (undated) DEVICE — 3M™ STERI-STRIP™ REINFORCED ADHESIVE SKIN CLOSURES, R1547, 1/2 IN X 4 IN (12 MM X 100 MM), 6 STRIPS/ENVELOPE: Brand: 3M™ STERI-STRIP™

## (undated) DEVICE — GAUZE SPONGES,16 PLY: Brand: CURITY

## (undated) DEVICE — SUT MONOCRYL 0 CT-1 27 IN Y340H

## (undated) DEVICE — ABDOMINAL PAD: Brand: DERMACEA

## (undated) DEVICE — SUT PLAIN 3-0 CT-1 27 IN 842H

## (undated) DEVICE — SUT VICRYL 0 CT-1 36 IN J946H

## (undated) DEVICE — SWABSTCK, BENZOIN TINCTURE, 1/PK, STRL: Brand: APLICARE

## (undated) DEVICE — ABG MICROSTICKS SAFETY

## (undated) DEVICE — SCD SEQUENTIAL COMPRESSION COMFORT SLEEVE MEDIUM KNEE LENGTH: Brand: KENDALL SCD